# Patient Record
Sex: FEMALE | Race: BLACK OR AFRICAN AMERICAN | NOT HISPANIC OR LATINO | Employment: UNEMPLOYED | URBAN - METROPOLITAN AREA
[De-identification: names, ages, dates, MRNs, and addresses within clinical notes are randomized per-mention and may not be internally consistent; named-entity substitution may affect disease eponyms.]

---

## 2017-10-22 ENCOUNTER — HOSPITAL ENCOUNTER (EMERGENCY)
Facility: HOSPITAL | Age: 27
Discharge: HOME | End: 2017-10-22
Payer: MEDICAID

## 2017-10-22 VITALS
SYSTOLIC BLOOD PRESSURE: 114 MMHG | OXYGEN SATURATION: 96 % | TEMPERATURE: 98 F | HEART RATE: 101 BPM | HEIGHT: 64 IN | DIASTOLIC BLOOD PRESSURE: 65 MMHG | BODY MASS INDEX: 25.61 KG/M2 | RESPIRATION RATE: 20 BRPM | WEIGHT: 150 LBS

## 2017-10-22 DIAGNOSIS — O03.9 MISCARRIAGE: Primary | ICD-10-CM

## 2017-10-22 DIAGNOSIS — N39.0 URINARY TRACT INFECTION WITHOUT HEMATURIA, SITE UNSPECIFIED: ICD-10-CM

## 2017-10-22 DIAGNOSIS — R10.9 ABDOMINAL CRAMPING: ICD-10-CM

## 2017-10-22 LAB
B-HCG UR QL: NEGATIVE
BACTERIA #/AREA URNS HPF: ABNORMAL /HPF
BILIRUB UR QL STRIP: NEGATIVE
CLARITY UR: ABNORMAL
COLOR UR: YELLOW
GLUCOSE UR QL STRIP: NEGATIVE
HGB UR QL STRIP: ABNORMAL
KETONES UR QL STRIP: NEGATIVE
LEUKOCYTE ESTERASE UR QL STRIP: ABNORMAL
MICROSCOPIC COMMENT: ABNORMAL
NITRITE UR QL STRIP: POSITIVE
PH UR STRIP: 6 [PH] (ref 5–8)
PROT UR QL STRIP: NEGATIVE
RBC #/AREA URNS HPF: 2 /HPF (ref 0–4)
SP GR UR STRIP: 1.02 (ref 1–1.03)
SQUAMOUS #/AREA URNS HPF: 20 /HPF
URN SPEC COLLECT METH UR: ABNORMAL
UROBILINOGEN UR STRIP-ACNC: NEGATIVE EU/DL
WBC #/AREA URNS HPF: 15 /HPF (ref 0–5)
WBC CLUMPS URNS QL MICRO: ABNORMAL

## 2017-10-22 PROCEDURE — 81025 URINE PREGNANCY TEST: CPT

## 2017-10-22 PROCEDURE — 81000 URINALYSIS NONAUTO W/SCOPE: CPT

## 2017-10-22 PROCEDURE — 99283 EMERGENCY DEPT VISIT LOW MDM: CPT

## 2017-10-22 RX ORDER — NITROFURANTOIN (MACROCRYSTALS) 100 MG/1
100 CAPSULE ORAL 2 TIMES DAILY
Qty: 14 CAPSULE | Refills: 0 | Status: SHIPPED | OUTPATIENT
Start: 2017-10-22 | End: 2017-10-29

## 2017-10-22 RX ORDER — AMITRIPTYLINE HYDROCHLORIDE 50 MG/1
50 TABLET, FILM COATED ORAL NIGHTLY
COMMUNITY

## 2017-10-22 RX ORDER — OXYCODONE AND ACETAMINOPHEN 5; 325 MG/1; MG/1
1 TABLET ORAL EVERY 4 HOURS PRN
COMMUNITY
End: 2023-01-23

## 2017-10-22 RX ORDER — METHOCARBAMOL 500 MG/1
500 TABLET, FILM COATED ORAL 4 TIMES DAILY
Status: ON HOLD | COMMUNITY
End: 2023-01-25 | Stop reason: SDUPTHER

## 2017-10-22 RX ORDER — GABAPENTIN 600 MG/1
600 TABLET ORAL 3 TIMES DAILY
Status: ON HOLD | COMMUNITY
End: 2023-01-25 | Stop reason: SDUPTHER

## 2017-10-22 NOTE — ED PROVIDER NOTES
"SCRIBE #1 NOTE: I, Saw Cook, am scribing for, and in the presence of, Dinorah Glass PA-C. I have scribed the entire note.      History      Chief Complaint   Patient presents with    Vaginal Bleeding     Pt states, "I have been having cramping and bleeding for about a week and I don't know if I'm pregnant."       Review of patient's allergies indicates:   Allergen Reactions    Ceclor [cefaclor]     Hydrocodone Itching        HPI   HPI    10/22/2017, 4:14 PM   History obtained from the patient      History of Present Illness: Phillip Russell is a 27 y.o. female patient who presents to the Emergency Department for vaginal bleeding which onset gradually 6 days ago. Symptoms are intermittent and waxing and waning in severity. Pt states she last "spotted" on Friday. No mitigating or exacerbating factors reported. Associated sxs include pelvic pain described as "cramping." Patient denies any fever, chills, n/v/d, abd pain, hematochezia, constipation, dysuria, urinary frequency, vaginal discharge, vaginal pain, HA, light-headedness, and all other sxs at this time. No prior tx reported . Pt states that she tested positive for pregnancy at home 5 days ago but "felt something fall out of me, like a clot, on Monday and last night." Pt states she is unsure if she is still pregnant. Pt's LNMP was 8/20/17. Pt currently takes Percocet, Robaxin, Elavil, Naproxin, and Gabapentin daily. No further complaints or concerns at this time.       Arrival mode: Personal vehicle    PCP: YOJANA Hanson       Past Medical History:  Past Medical History:   Diagnosis Date    Bulging of cervical intervertebral disc     Herniated cervical disc     Miscarriage     Neck pain, chronic        Past Surgical History:  Past Surgical History:   Procedure Laterality Date    DILATION AND CURETTAGE OF UTERUS      HEMORRHOID SURGERY           Family History:  No family history on file.    Social History:  Social History "     Social History Main Topics    Smoking status: Never Smoker    Smokeless tobacco: Never Used    Alcohol use No    Drug use: No    Sexual activity: Not given       ROS   Review of Systems   Constitutional: Negative for chills and fever.   HENT: Negative for sore throat.    Respiratory: Negative for shortness of breath.    Cardiovascular: Negative for chest pain.   Gastrointestinal: Negative for abdominal pain, blood in stool, constipation, diarrhea, nausea and vomiting.   Genitourinary: Positive for pelvic pain and vaginal bleeding. Negative for dysuria, frequency, vaginal discharge and vaginal pain.   Musculoskeletal: Negative for back pain.   Skin: Negative for rash.   Neurological: Positive for dizziness. Negative for weakness, light-headedness and headaches.   Hematological: Does not bruise/bleed easily.   All other systems reviewed and are negative.      Physical Exam      Initial Vitals [10/22/17 1543]   BP Pulse Resp Temp SpO2   114/65 101 20 98.2 °F (36.8 °C) 96 %      MAP       81.33          Physical Exam  Nursing Notes and Vital Signs Reviewed.  Constitutional: Patient is in no acute distress. Well-developed and well-nourished.  Head: Atraumatic. Normocephalic.  Eyes: PERRL. EOM intact. Conjunctivae are not pale. No scleral icterus.  ENT: Mucous membranes are moist.     Neck: Supple. Full ROM. No lymphadenopathy.  Cardiovascular: Regular rate. Regular rhythm.  Pulmonary/Chest: No respiratory distress.   Abdominal: Soft and non-distended.  There is no tenderness.  No rebound, guarding, or rigidity.   Pelvic: A female chaperone was present for this examination. Nl external inspection. No lesions or abnormalities were visible on the labia majora or minora. Cervical os is closed. There is no CMT. There is no blood in the vaginal vault. No discharge. No adnexal tenderness. No adnexal masses.   Musculoskeletal: Moves all extremities. No obvious deformities. No edema.   Skin: Warm and dry.  Neurological:   "Alert, awake, and appropriate.  Normal speech.  No acute focal neurological deficits are appreciated.  Psychiatric: Normal affect. Good eye contact. Appropriate in content.    ED Course    Procedures  ED Vital Signs:  Vitals:    10/22/17 1543   BP: 114/65   Pulse: 101   Resp: 20   Temp: 98.2 °F (36.8 °C)   TempSrc: Oral   SpO2: 96%   Weight: 68 kg (150 lb)   Height: 5' 4" (1.626 m)       Abnormal Lab Results:  Labs Reviewed   URINALYSIS - Abnormal; Notable for the following:        Result Value    Appearance, UA Hazy (*)     Occult Blood UA Trace (*)     Nitrite, UA Positive (*)     Leukocytes, UA 1+ (*)     All other components within normal limits   URINALYSIS MICROSCOPIC - Abnormal; Notable for the following:     WBC, UA 15 (*)     Bacteria, UA Few (*)     All other components within normal limits   PREGNANCY TEST, URINE RAPID        All Lab Results:  Results for orders placed or performed during the hospital encounter of 10/22/17   Urinalysis Clean Catch   Result Value Ref Range    Specimen UA Urine, Clean Catch     Color, UA Yellow Yellow, Straw, Tiffany    Appearance, UA Hazy (A) Clear    pH, UA 6.0 5.0 - 8.0    Specific Gravity, UA 1.020 1.005 - 1.030    Protein, UA Negative Negative    Glucose, UA Negative Negative    Ketones, UA Negative Negative    Bilirubin (UA) Negative Negative    Occult Blood UA Trace (A) Negative    Nitrite, UA Positive (A) Negative    Urobilinogen, UA Negative <2.0 EU/dL    Leukocytes, UA 1+ (A) Negative   Pregnancy, urine rapid (UPT)   Result Value Ref Range    Preg Test, Ur Negative    Urinalysis Microscopic   Result Value Ref Range    RBC, UA 2 0 - 4 /hpf    WBC, UA 15 (H) 0 - 5 /hpf    WBC Clumps, UA Rare None-Rare    Bacteria, UA Few (A) None-Occ /hpf    Squam Epithel, UA 20 /hpf    Microscopic Comment SEE COMMENT             The Emergency Provider reviewed the vital signs and test results, which are outlined above.    ED Discussion     4:43 PM: Reassessed pt at this time.  Pt is " resting comfortably, in NAD, and VSS at this time. Discussed with pt all pertinent ED information and results. Discussed pt dx and plan of tx. Gave pt all f/u and return to the ED instructions. All questions and concerns were addressed at this time. Pt expresses understanding of information and instructions, and is comfortable with plan to discharge. Pt is stable for discharge.    I discussed with patient and/or family/caretaker that evaluation in the ED does not suggest any emergent or life threatening medical conditions requiring immediate intervention beyond what was provided in the ED, and I believe patient is safe for discharge.  Regardless, an unremarkable evaluation in the ED does not preclude the development or presence of a serious of life threatening condition. As such, patient was instructed to return immediately for any worsening or change in current symptoms.        New Prescriptions    NITROFURANTOIN (MACRODANTIN) 100 MG CAPSULE    Take 1 capsule (100 mg total) by mouth 2 (two) times daily.       Follow-up Information     YOJANA Hanson In 3 days.    Specialty:  Family Medicine  Contact information:  [address]  [facility] [address] [phone]             OB/GYN.    Contact information:  Schedule an appointment to follow-up in 2-3 days                   Medical Decision Making              Scribe Attestation:   Scribe #1: I performed the above scribed service and the documentation accurately describes the services I performed. I attest to the accuracy of the note.    Attending:   Physician Attestation Statement for Scribe #1: I, Dinorah Glass PA-C, personally performed the services described in this documentation, as scribed by Saw Cook, in my presence, and it is both accurate and complete.          Clinical Impression       ICD-10-CM ICD-9-CM   1. Miscarriage O03.9 634.90   2. Urinary tract infection without hematuria, site unspecified N39.0 599.0   3.  Abdominal cramping R10.9 789.00       Disposition:   Disposition: Discharged  Condition: Stable         Dinorah Glass PA-C  10/22/17 9230

## 2017-10-22 NOTE — ED TRIAGE NOTES
Pt states Monday she began cramping and bleeding, she felt something large fall out of her. Tuesday she took a home pregnancy test that was positive. Last night she had something large fall out of her again, so she wanted to get checked.

## 2021-08-13 ENCOUNTER — OFFICE VISIT (OUTPATIENT)
Dept: URGENT CARE | Facility: CLINIC | Age: 31
End: 2021-08-13
Payer: MEDICAID

## 2021-08-13 VITALS
OXYGEN SATURATION: 99 % | DIASTOLIC BLOOD PRESSURE: 61 MMHG | SYSTOLIC BLOOD PRESSURE: 117 MMHG | BODY MASS INDEX: 25.61 KG/M2 | TEMPERATURE: 97 F | HEART RATE: 85 BPM | HEIGHT: 64 IN | RESPIRATION RATE: 18 BRPM | WEIGHT: 150 LBS

## 2021-08-13 DIAGNOSIS — R50.9 FEVER, UNSPECIFIED FEVER CAUSE: Primary | ICD-10-CM

## 2021-08-13 LAB
CTP QC/QA: YES
SARS-COV-2 RDRP RESP QL NAA+PROBE: NEGATIVE

## 2021-08-13 PROCEDURE — 99202 PR OFFICE/OUTPT VISIT, NEW, LEVL II, 15-29 MIN: ICD-10-PCS | Mod: S$GLB,,, | Performed by: EMERGENCY MEDICINE

## 2021-08-13 PROCEDURE — 99202 OFFICE O/P NEW SF 15 MIN: CPT | Mod: S$GLB,,, | Performed by: EMERGENCY MEDICINE

## 2021-08-13 PROCEDURE — U0002: ICD-10-PCS | Mod: QW,S$GLB,, | Performed by: EMERGENCY MEDICINE

## 2021-08-13 PROCEDURE — U0002 COVID-19 LAB TEST NON-CDC: HCPCS | Mod: QW,S$GLB,, | Performed by: EMERGENCY MEDICINE

## 2021-08-13 RX ORDER — FLUTICASONE PROPIONATE 50 MCG
2 SPRAY, SUSPENSION (ML) NASAL DAILY
Qty: 16 G | Refills: 0 | Status: SHIPPED | OUTPATIENT
Start: 2021-08-13

## 2021-08-16 ENCOUNTER — OFFICE VISIT (OUTPATIENT)
Dept: URGENT CARE | Facility: CLINIC | Age: 31
End: 2021-08-16
Payer: MEDICAID

## 2021-08-16 VITALS
OXYGEN SATURATION: 96 % | HEIGHT: 64 IN | RESPIRATION RATE: 16 BRPM | TEMPERATURE: 101 F | WEIGHT: 190 LBS | SYSTOLIC BLOOD PRESSURE: 119 MMHG | HEART RATE: 108 BPM | BODY MASS INDEX: 32.44 KG/M2 | DIASTOLIC BLOOD PRESSURE: 74 MMHG

## 2021-08-16 DIAGNOSIS — R05.9 COUGH: Primary | ICD-10-CM

## 2021-08-16 DIAGNOSIS — U07.1 COVID-19: ICD-10-CM

## 2021-08-16 LAB
CTP QC/QA: YES
SARS-COV-2 RDRP RESP QL NAA+PROBE: POSITIVE

## 2021-08-16 PROCEDURE — U0002: ICD-10-PCS | Mod: QW,CR,S$GLB, | Performed by: PHYSICIAN ASSISTANT

## 2021-08-16 PROCEDURE — U0002 COVID-19 LAB TEST NON-CDC: HCPCS | Mod: QW,CR,S$GLB, | Performed by: PHYSICIAN ASSISTANT

## 2021-08-16 PROCEDURE — 99213 OFFICE O/P EST LOW 20 MIN: CPT | Mod: S$GLB,,, | Performed by: PHYSICIAN ASSISTANT

## 2021-08-16 PROCEDURE — 99213 PR OFFICE/OUTPT VISIT, EST, LEVL III, 20-29 MIN: ICD-10-PCS | Mod: S$GLB,,, | Performed by: PHYSICIAN ASSISTANT

## 2021-08-16 RX ORDER — PROMETHAZINE HYDROCHLORIDE AND DEXTROMETHORPHAN HYDROBROMIDE 6.25; 15 MG/5ML; MG/5ML
5 SYRUP ORAL EVERY 4 HOURS PRN
Qty: 118 ML | Refills: 0 | Status: SHIPPED | OUTPATIENT
Start: 2021-08-16 | End: 2021-08-26

## 2021-08-16 RX ORDER — IBUPROFEN 200 MG
800 TABLET ORAL
Status: COMPLETED | OUTPATIENT
Start: 2021-08-16 | End: 2021-08-16

## 2021-08-16 RX ORDER — BENZONATATE 100 MG/1
100 CAPSULE ORAL 3 TIMES DAILY PRN
Qty: 20 CAPSULE | Refills: 0 | Status: SHIPPED | OUTPATIENT
Start: 2021-08-16 | End: 2021-08-26

## 2021-08-16 RX ADMIN — Medication 800 MG: at 04:08

## 2021-08-22 ENCOUNTER — PATIENT MESSAGE (OUTPATIENT)
Dept: CARDIOLOGY | Facility: HOSPITAL | Age: 31
End: 2021-08-22

## 2021-09-17 ENCOUNTER — NURSE TRIAGE (OUTPATIENT)
Dept: ADMINISTRATIVE | Facility: CLINIC | Age: 31
End: 2021-09-17

## 2022-02-28 ENCOUNTER — PATIENT MESSAGE (OUTPATIENT)
Dept: RESEARCH | Facility: HOSPITAL | Age: 32
End: 2022-02-28
Payer: MEDICAID

## 2022-03-23 ENCOUNTER — OFFICE VISIT (OUTPATIENT)
Dept: URGENT CARE | Facility: CLINIC | Age: 32
End: 2022-03-23
Payer: MEDICAID

## 2022-03-23 VITALS
DIASTOLIC BLOOD PRESSURE: 67 MMHG | HEART RATE: 73 BPM | WEIGHT: 190 LBS | SYSTOLIC BLOOD PRESSURE: 110 MMHG | RESPIRATION RATE: 18 BRPM | HEIGHT: 64 IN | BODY MASS INDEX: 32.44 KG/M2 | TEMPERATURE: 99 F | OXYGEN SATURATION: 96 %

## 2022-03-23 DIAGNOSIS — R52 BODY ACHES: ICD-10-CM

## 2022-03-23 DIAGNOSIS — R42 LIGHT HEADEDNESS: ICD-10-CM

## 2022-03-23 DIAGNOSIS — R55 SYNCOPE, UNSPECIFIED SYNCOPE TYPE: Primary | ICD-10-CM

## 2022-03-23 LAB — GLUCOSE SERPL-MCNC: 92 MG/DL (ref 70–110)

## 2022-03-23 PROCEDURE — 1160F PR REVIEW ALL MEDS BY PRESCRIBER/CLIN PHARMACIST DOCUMENTED: ICD-10-PCS | Mod: CPTII,S$GLB,, | Performed by: PHYSICIAN ASSISTANT

## 2022-03-23 PROCEDURE — 3074F PR MOST RECENT SYSTOLIC BLOOD PRESSURE < 130 MM HG: ICD-10-PCS | Mod: CPTII,S$GLB,, | Performed by: PHYSICIAN ASSISTANT

## 2022-03-23 PROCEDURE — 3078F PR MOST RECENT DIASTOLIC BLOOD PRESSURE < 80 MM HG: ICD-10-PCS | Mod: CPTII,S$GLB,, | Performed by: PHYSICIAN ASSISTANT

## 2022-03-23 PROCEDURE — 3078F DIAST BP <80 MM HG: CPT | Mod: CPTII,S$GLB,, | Performed by: PHYSICIAN ASSISTANT

## 2022-03-23 PROCEDURE — 99214 OFFICE O/P EST MOD 30 MIN: CPT | Mod: S$GLB,,, | Performed by: PHYSICIAN ASSISTANT

## 2022-03-23 PROCEDURE — 93005 EKG 12-LEAD: ICD-10-PCS | Mod: S$GLB,,, | Performed by: PHYSICIAN ASSISTANT

## 2022-03-23 PROCEDURE — 93010 ELECTROCARDIOGRAM REPORT: CPT | Mod: S$GLB,,, | Performed by: INTERNAL MEDICINE

## 2022-03-23 PROCEDURE — 1160F RVW MEDS BY RX/DR IN RCRD: CPT | Mod: CPTII,S$GLB,, | Performed by: PHYSICIAN ASSISTANT

## 2022-03-23 PROCEDURE — 82962 GLUCOSE BLOOD TEST: CPT | Mod: S$GLB,,, | Performed by: PHYSICIAN ASSISTANT

## 2022-03-23 PROCEDURE — 93010 EKG 12-LEAD: ICD-10-PCS | Mod: S$GLB,,, | Performed by: INTERNAL MEDICINE

## 2022-03-23 PROCEDURE — 99214 PR OFFICE/OUTPT VISIT, EST, LEVL IV, 30-39 MIN: ICD-10-PCS | Mod: S$GLB,,, | Performed by: PHYSICIAN ASSISTANT

## 2022-03-23 PROCEDURE — 3008F PR BODY MASS INDEX (BMI) DOCUMENTED: ICD-10-PCS | Mod: CPTII,S$GLB,, | Performed by: PHYSICIAN ASSISTANT

## 2022-03-23 PROCEDURE — 1159F PR MEDICATION LIST DOCUMENTED IN MEDICAL RECORD: ICD-10-PCS | Mod: CPTII,S$GLB,, | Performed by: PHYSICIAN ASSISTANT

## 2022-03-23 PROCEDURE — 3008F BODY MASS INDEX DOCD: CPT | Mod: CPTII,S$GLB,, | Performed by: PHYSICIAN ASSISTANT

## 2022-03-23 PROCEDURE — 3074F SYST BP LT 130 MM HG: CPT | Mod: CPTII,S$GLB,, | Performed by: PHYSICIAN ASSISTANT

## 2022-03-23 PROCEDURE — 93005 ELECTROCARDIOGRAM TRACING: CPT | Mod: S$GLB,,, | Performed by: PHYSICIAN ASSISTANT

## 2022-03-23 PROCEDURE — 1159F MED LIST DOCD IN RCRD: CPT | Mod: CPTII,S$GLB,, | Performed by: PHYSICIAN ASSISTANT

## 2022-03-23 PROCEDURE — 82962 POCT GLUCOSE, HAND-HELD DEVICE: ICD-10-PCS | Mod: S$GLB,,, | Performed by: PHYSICIAN ASSISTANT

## 2022-03-23 RX ORDER — IBUPROFEN 600 MG/1
600 TABLET ORAL EVERY 6 HOURS PRN
Qty: 30 TABLET | Refills: 0 | Status: SHIPPED | OUTPATIENT
Start: 2022-03-23 | End: 2023-03-23

## 2022-03-23 NOTE — LETTER
March 23, 2022      [facility]  [address]  [place] [address]  Phone: [phone]       Patient: Phillip Russell   YOB: 1990  Date of Visit: 03/23/2022    To Whom It May Concern:    Mildred Russell  was at [facility] on 03/23/2022. The patient may return to work/school on 3/24/22 with no restrictions. If you have any questions or concerns, or if I can be of further assistance, please do not hesitate to contact me.    Sincerely,    Andria Moseley PA-C

## 2022-03-26 ENCOUNTER — TELEPHONE (OUTPATIENT)
Dept: URGENT CARE | Facility: CLINIC | Age: 32
End: 2022-03-26
Payer: MEDICAID

## 2022-10-05 ENCOUNTER — HOSPITAL ENCOUNTER (EMERGENCY)
Facility: HOSPITAL | Age: 32
Discharge: HOME | End: 2022-10-06
Attending: EMERGENCY MEDICINE
Payer: MEDICAID

## 2022-10-05 DIAGNOSIS — F16.10 PCP (PHENCYCLIDINE) ABUSE: ICD-10-CM

## 2022-10-05 DIAGNOSIS — R42 DIZZINESS: Primary | ICD-10-CM

## 2022-10-05 DIAGNOSIS — R00.0 TACHYCARDIA: ICD-10-CM

## 2022-10-05 LAB
ALBUMIN SERPL BCP-MCNC: 4.6 G/DL (ref 3.5–5.2)
ALP SERPL-CCNC: 80 U/L (ref 55–135)
ALT SERPL W/O P-5'-P-CCNC: 15 U/L (ref 10–44)
AMPHET+METHAMPHET UR QL: NEGATIVE
ANION GAP SERPL CALC-SCNC: 16 MMOL/L (ref 8–16)
AST SERPL-CCNC: 18 U/L (ref 10–40)
B-HCG UR QL: NEGATIVE
BACTERIA #/AREA URNS HPF: ABNORMAL /HPF
BARBITURATES UR QL SCN>200 NG/ML: NEGATIVE
BASOPHILS # BLD AUTO: 0.02 K/UL (ref 0–0.2)
BASOPHILS NFR BLD: 0.4 % (ref 0–1.9)
BENZODIAZ UR QL SCN>200 NG/ML: NEGATIVE
BILIRUB SERPL-MCNC: 1.3 MG/DL (ref 0.1–1)
BILIRUB UR QL STRIP: NEGATIVE
BUN SERPL-MCNC: 16 MG/DL (ref 6–20)
BZE UR QL SCN: NEGATIVE
CALCIUM SERPL-MCNC: 10.2 MG/DL (ref 8.7–10.5)
CANNABINOIDS UR QL SCN: NEGATIVE
CHLORIDE SERPL-SCNC: 99 MMOL/L (ref 95–110)
CLARITY UR: ABNORMAL
CO2 SERPL-SCNC: 21 MMOL/L (ref 23–29)
COLOR UR: YELLOW
CREAT SERPL-MCNC: 1.2 MG/DL (ref 0.5–1.4)
CREAT UR-MCNC: 141.9 MG/DL (ref 15–325)
DIFFERENTIAL METHOD: ABNORMAL
EOSINOPHIL # BLD AUTO: 0 K/UL (ref 0–0.5)
EOSINOPHIL NFR BLD: 0.8 % (ref 0–8)
ERYTHROCYTE [DISTWIDTH] IN BLOOD BY AUTOMATED COUNT: 12.2 % (ref 11.5–14.5)
EST. GFR  (NO RACE VARIABLE): >60 ML/MIN/1.73 M^2
GLUCOSE SERPL-MCNC: 75 MG/DL (ref 70–110)
GLUCOSE UR QL STRIP: NEGATIVE
HCT VFR BLD AUTO: 43.4 % (ref 37–48.5)
HGB BLD-MCNC: 15.1 G/DL (ref 12–16)
HGB UR QL STRIP: ABNORMAL
HYALINE CASTS #/AREA URNS LPF: 1 /LPF
IMM GRANULOCYTES # BLD AUTO: 0.02 K/UL (ref 0–0.04)
IMM GRANULOCYTES NFR BLD AUTO: 0.4 % (ref 0–0.5)
KETONES UR QL STRIP: ABNORMAL
LEUKOCYTE ESTERASE UR QL STRIP: NEGATIVE
LYMPHOCYTES # BLD AUTO: 2 K/UL (ref 1–4.8)
LYMPHOCYTES NFR BLD: 41.8 % (ref 18–48)
MCH RBC QN AUTO: 26.9 PG (ref 27–31)
MCHC RBC AUTO-ENTMCNC: 34.8 G/DL (ref 32–36)
MCV RBC AUTO: 77 FL (ref 82–98)
METHADONE UR QL SCN>300 NG/ML: NEGATIVE
MICROSCOPIC COMMENT: ABNORMAL
MONOCYTES # BLD AUTO: 0.5 K/UL (ref 0.3–1)
MONOCYTES NFR BLD: 10.3 % (ref 4–15)
NEUTROPHILS # BLD AUTO: 2.3 K/UL (ref 1.8–7.7)
NEUTROPHILS NFR BLD: 46.3 % (ref 38–73)
NITRITE UR QL STRIP: NEGATIVE
NRBC BLD-RTO: 0 /100 WBC
OPIATES UR QL SCN: NEGATIVE
PCP UR QL SCN>25 NG/ML: ABNORMAL
PH UR STRIP: 5 [PH] (ref 5–8)
PLATELET # BLD AUTO: 293 K/UL (ref 150–450)
PMV BLD AUTO: 9.2 FL (ref 9.2–12.9)
POTASSIUM SERPL-SCNC: 4.2 MMOL/L (ref 3.5–5.1)
PROT SERPL-MCNC: 8.5 G/DL (ref 6–8.4)
PROT UR QL STRIP: ABNORMAL
RBC # BLD AUTO: 5.61 M/UL (ref 4–5.4)
RBC #/AREA URNS HPF: 3 /HPF (ref 0–4)
SODIUM SERPL-SCNC: 136 MMOL/L (ref 136–145)
SP GR UR STRIP: 1.01 (ref 1–1.03)
SQUAMOUS #/AREA URNS HPF: 36 /HPF
T4 FREE SERPL-MCNC: 1.33 NG/DL (ref 0.71–1.51)
TOXICOLOGY INFORMATION: ABNORMAL
TSH SERPL DL<=0.005 MIU/L-ACNC: 1.52 UIU/ML (ref 0.4–4)
URN SPEC COLLECT METH UR: ABNORMAL
UROBILINOGEN UR STRIP-ACNC: NEGATIVE EU/DL
WBC # BLD AUTO: 4.86 K/UL (ref 3.9–12.7)
WBC #/AREA URNS HPF: 6 /HPF (ref 0–5)

## 2022-10-05 PROCEDURE — 85025 COMPLETE CBC W/AUTO DIFF WBC: CPT | Performed by: NURSE PRACTITIONER

## 2022-10-05 PROCEDURE — 84443 ASSAY THYROID STIM HORMONE: CPT | Performed by: NURSE PRACTITIONER

## 2022-10-05 PROCEDURE — 82550 ASSAY OF CK (CPK): CPT | Performed by: NURSE PRACTITIONER

## 2022-10-05 PROCEDURE — 96360 HYDRATION IV INFUSION INIT: CPT

## 2022-10-05 PROCEDURE — 80053 COMPREHEN METABOLIC PANEL: CPT | Performed by: NURSE PRACTITIONER

## 2022-10-05 PROCEDURE — 81025 URINE PREGNANCY TEST: CPT | Performed by: NURSE PRACTITIONER

## 2022-10-05 PROCEDURE — 96361 HYDRATE IV INFUSION ADD-ON: CPT

## 2022-10-05 PROCEDURE — 25000003 PHARM REV CODE 250: Performed by: EMERGENCY MEDICINE

## 2022-10-05 PROCEDURE — 84439 ASSAY OF FREE THYROXINE: CPT | Performed by: NURSE PRACTITIONER

## 2022-10-05 PROCEDURE — 81000 URINALYSIS NONAUTO W/SCOPE: CPT | Mod: 59 | Performed by: NURSE PRACTITIONER

## 2022-10-05 PROCEDURE — 99285 EMERGENCY DEPT VISIT HI MDM: CPT | Mod: 25

## 2022-10-05 PROCEDURE — 84480 ASSAY TRIIODOTHYRONINE (T3): CPT | Performed by: NURSE PRACTITIONER

## 2022-10-05 PROCEDURE — 85379 FIBRIN DEGRADATION QUANT: CPT | Mod: ER | Performed by: EMERGENCY MEDICINE

## 2022-10-05 PROCEDURE — 80307 DRUG TEST PRSMV CHEM ANLYZR: CPT | Performed by: EMERGENCY MEDICINE

## 2022-10-05 RX ADMIN — SODIUM CHLORIDE 1000 ML: 0.9 INJECTION, SOLUTION INTRAVENOUS at 10:10

## 2022-10-05 NOTE — Clinical Note
"Phillip Fordjaime Russell was seen and treated in our emergency department on 10/5/2022.  She may return to work on 10/07/2022.       If you have any questions or concerns, please don't hesitate to call.      CHINEDU MCPHERSON RN    "

## 2022-10-06 VITALS
HEART RATE: 78 BPM | WEIGHT: 139 LBS | OXYGEN SATURATION: 100 % | BODY MASS INDEX: 23.86 KG/M2 | RESPIRATION RATE: 20 BRPM | TEMPERATURE: 98 F | DIASTOLIC BLOOD PRESSURE: 57 MMHG | SYSTOLIC BLOOD PRESSURE: 112 MMHG

## 2022-10-06 LAB
CK SERPL-CCNC: 67 U/L (ref 20–180)
D DIMER PPP IA.FEU-MCNC: 0.53 MG/L FEU
T3 SERPL-MCNC: 80 NG/DL (ref 60–180)

## 2022-10-06 PROCEDURE — 25500020 PHARM REV CODE 255: Performed by: EMERGENCY MEDICINE

## 2022-10-06 RX ADMIN — IOHEXOL 100 ML: 350 INJECTION, SOLUTION INTRAVENOUS at 03:10

## 2022-10-06 NOTE — ED PROVIDER NOTES
"SCRIBE #1 NOTE: I, Garland Mckinley, am scribing for, and in the presence of, Maya Ding MD. I have scribed the entire note.       History     Chief Complaint   Patient presents with    Loss of Consciousness     Pt. Reports that she has been having episodes of "passing out" and falling. She says this has been going on for several months, she passed out tonight so her mother brought her in for evaluation.      Review of patient's allergies indicates:   Allergen Reactions    Ceclor [cefaclor]     Hydrocodone Itching         History of Present Illness     HPI    10/5/2022, 9:46 PM  History obtained from the patient      History of Present Illness: Phillip Russell is a 31 y.o. female patient who presents to the Emergency Department for evaluation of LOC which onset gradually x 4 days. Pt says she has been having episodes of passing out frequently since last Saturday. Pt reports having similar episodes in the past but never this often. Symptoms are constant and moderate in severity. No mitigating or exacerbating factors reported. Associated sxs include dizziness, tremors, nausea, CP,and palpitations. Patient denies any f/c, HA, rhinorrhea, congestion, diarrhea, and all other sxs at this time. No prior Tx reported. No further complaints or concerns at this time.       Arrival mode: Personal vehicle     PCP: YOJANA Hanson        Past Medical History:  Past Medical History:   Diagnosis Date    Bulging of cervical intervertebral disc     Herniated cervical disc     Miscarriage     Neck pain, chronic        Past Surgical History:  Past Surgical History:   Procedure Laterality Date    DILATION AND CURETTAGE OF UTERUS      HEMORRHOID SURGERY           Family History:  No family history on file.    Social History:  Social History     Tobacco Use    Smoking status: Never    Smokeless tobacco: Never   Substance and Sexual Activity    Alcohol use: No    Drug use: No    Sexual activity: Not on file        Review of " Systems     Review of Systems   Constitutional:  Negative for chills and fever.   HENT:  Negative for congestion, rhinorrhea and sore throat.    Respiratory:  Negative for shortness of breath.    Cardiovascular:  Positive for chest pain and palpitations.   Gastrointestinal:  Positive for nausea. Negative for diarrhea.   Genitourinary:  Negative for dysuria.   Musculoskeletal:  Negative for back pain.   Skin:  Negative for rash.   Neurological:  Positive for dizziness, tremors and light-headedness. Negative for weakness and headaches.   Hematological:  Does not bruise/bleed easily.   All other systems reviewed and are negative.     Physical Exam     Initial Vitals [10/05/22 2053]   BP Pulse Resp Temp SpO2   (!) 131/94 (!) 130 18 98.1 °F (36.7 °C) 97 %      MAP       --          Physical Exam  Nursing Notes and Vital Signs Reviewed.  Constitutional: Patient is in no acute distress. Well-developed and well-nourished.  Head: Atraumatic. Normocephalic.  Eyes: PERRL. EOM intact. Conjunctivae are not pale. No scleral icterus.  ENT: Mucous membranes are dry. Oropharynx is clear and symmetric.    Neck: Supple. Full ROM. No lymphadenopathy.  Cardiovascular: Regular rate. Regular rhythm. No murmurs, rubs, or gallops. Distal pulses are 2+ and symmetric.  Pulmonary/Chest: No respiratory distress. Clear to auscultation bilaterally. No wheezing or rales.  Abdominal: Soft and non-distended.  There is no tenderness.  No rebound, guarding, or rigidity. Good bowel sounds.  Genitourinary: No CVA tenderness  Musculoskeletal: Moves all extremities. No obvious deformities. No edema. No calf tenderness.  Skin: Warm and dry.  Neurological:  Alert, awake, and appropriate.  Normal speech.  No acute focal neurological deficits are appreciated.  Psychiatric: Normal affect. Good eye contact. Appropriate in content.     ED Course   Procedures  ED Vital Signs:  Vitals:    10/05/22 2053 10/05/22 2057 10/05/22 2211 10/05/22 2233   BP: (!) 131/94  114/81 119/83 123/86   Pulse: (!) 130 (!) 148 100 110   Resp: 18  18 (!) 25   Temp: 98.1 °F (36.7 °C)      TempSrc: Oral      SpO2: 97%  100% 100%   Weight: 63 kg (139 lb)       10/05/22 2316 10/06/22 0003 10/06/22 0033 10/06/22 0138   BP: 121/76 135/73 135/72 134/74   Pulse: 96 95 91 93   Resp: (!) 22 (!) 23 (!) 24 (!) 27   Temp:       TempSrc:       SpO2: 98% 98% 98% 98%   Weight:        10/06/22 0203 10/06/22 0437   BP: 126/72 (!) 112/57   Pulse: 91 78   Resp: 20 20   Temp:     TempSrc:     SpO2: 100% 100%   Weight:         Abnormal Lab Results:  Labs Reviewed   CBC W/ AUTO DIFFERENTIAL - Abnormal; Notable for the following components:       Result Value    RBC 5.61 (*)     MCV 77 (*)     MCH 26.9 (*)     All other components within normal limits   COMPREHENSIVE METABOLIC PANEL - Abnormal; Notable for the following components:    CO2 21 (*)     Total Protein 8.5 (*)     Total Bilirubin 1.3 (*)     All other components within normal limits   URINALYSIS, REFLEX TO URINE CULTURE - Abnormal; Notable for the following components:    Appearance, UA Hazy (*)     Protein, UA Trace (*)     Ketones, UA 2+ (*)     Occult Blood UA 2+ (*)     All other components within normal limits    Narrative:     Specimen Source->Urine   DRUG SCREEN PANEL, URINE EMERGENCY - Abnormal; Notable for the following components:    Phencyclidine Presumptive Positive (*)     All other components within normal limits    Narrative:     Specimen Source->Urine   URINALYSIS MICROSCOPIC - Abnormal; Notable for the following components:    WBC, UA 6 (*)     All other components within normal limits    Narrative:     Specimen Source->Urine   D DIMER, QUANTITATIVE - Abnormal; Notable for the following components:    D-Dimer 0.53 (*)     All other components within normal limits   TSH   PREGNANCY TEST, URINE RAPID    Narrative:     Specimen Source->Urine   T3   T4, FREE   CK   CK        All Lab Results:  Results for orders placed or performed during the  hospital encounter of 10/05/22   CBC auto differential   Result Value Ref Range    WBC 4.86 3.90 - 12.70 K/uL    RBC 5.61 (H) 4.00 - 5.40 M/uL    Hemoglobin 15.1 12.0 - 16.0 g/dL    Hematocrit 43.4 37.0 - 48.5 %    MCV 77 (L) 82 - 98 fL    MCH 26.9 (L) 27.0 - 31.0 pg    MCHC 34.8 32.0 - 36.0 g/dL    RDW 12.2 11.5 - 14.5 %    Platelets 293 150 - 450 K/uL    MPV 9.2 9.2 - 12.9 fL    Immature Granulocytes 0.4 0.0 - 0.5 %    Gran # (ANC) 2.3 1.8 - 7.7 K/uL    Immature Grans (Abs) 0.02 0.00 - 0.04 K/uL    Lymph # 2.0 1.0 - 4.8 K/uL    Mono # 0.5 0.3 - 1.0 K/uL    Eos # 0.0 0.0 - 0.5 K/uL    Baso # 0.02 0.00 - 0.20 K/uL    nRBC 0 0 /100 WBC    Gran % 46.3 38.0 - 73.0 %    Lymph % 41.8 18.0 - 48.0 %    Mono % 10.3 4.0 - 15.0 %    Eosinophil % 0.8 0.0 - 8.0 %    Basophil % 0.4 0.0 - 1.9 %    Differential Method Automated    Comprehensive metabolic panel   Result Value Ref Range    Sodium 136 136 - 145 mmol/L    Potassium 4.2 3.5 - 5.1 mmol/L    Chloride 99 95 - 110 mmol/L    CO2 21 (L) 23 - 29 mmol/L    Glucose 75 70 - 110 mg/dL    BUN 16 6 - 20 mg/dL    Creatinine 1.2 0.5 - 1.4 mg/dL    Calcium 10.2 8.7 - 10.5 mg/dL    Total Protein 8.5 (H) 6.0 - 8.4 g/dL    Albumin 4.6 3.5 - 5.2 g/dL    Total Bilirubin 1.3 (H) 0.1 - 1.0 mg/dL    Alkaline Phosphatase 80 55 - 135 U/L    AST 18 10 - 40 U/L    ALT 15 10 - 44 U/L    Anion Gap 16 8 - 16 mmol/L    eGFR >60 >60 mL/min/1.73 m^2   TSH   Result Value Ref Range    TSH 1.521 0.400 - 4.000 uIU/mL   Urinalysis, Reflex to Urine Culture Urine, Clean Catch    Specimen: Urine   Result Value Ref Range    Specimen UA Urine, Clean Catch     Color, UA Yellow Yellow, Straw, Tiffany    Appearance, UA Hazy (A) Clear    pH, UA 5.0 5.0 - 8.0    Specific Gravity, UA 1.010 1.005 - 1.030    Protein, UA Trace (A) Negative    Glucose, UA Negative Negative    Ketones, UA 2+ (A) Negative    Bilirubin (UA) Negative Negative    Occult Blood UA 2+ (A) Negative    Nitrite, UA Negative Negative    Urobilinogen,  UA Negative <2.0 EU/dL    Leukocytes, UA Negative Negative   Pregnancy, urine rapid   Result Value Ref Range    Preg Test, Ur Negative    T3   Result Value Ref Range    T3, Total 80 60 - 180 ng/dL   T4, Free   Result Value Ref Range    Free T4 1.33 0.71 - 1.51 ng/dL   Drug screen panel, emergency   Result Value Ref Range    Benzodiazepines Negative Negative    Methadone metabolites Negative Negative    Cocaine (Metab.) Negative Negative    Opiate Scrn, Ur Negative Negative    Barbiturate Screen, Ur Negative Negative    Amphetamine Screen, Ur Negative Negative    THC Negative Negative    Phencyclidine Presumptive Positive (A) Negative    Creatinine, Urine 141.9 15.0 - 325.0 mg/dL    Toxicology Information SEE COMMENT    Urinalysis Microscopic   Result Value Ref Range    RBC, UA 3 0 - 4 /hpf    WBC, UA 6 (H) 0 - 5 /hpf    Bacteria Occasional None-Occ /hpf    Squam Epithel, UA 36 /hpf    Hyaline Casts, UA 1 0-1/lpf /lpf    Microscopic Comment SEE COMMENT    D dimer, quantitative   Result Value Ref Range    D-Dimer 0.53 (H) <0.50 mg/L FEU   CK   Result Value Ref Range    CPK 67 20 - 180 U/L         Imaging Results:  Imaging Results              CTA Chest Non-Coronary (PE Studies) (In process)                      X-Ray Chest AP Portable (Final result)  Result time 10/05/22 21:29:50      Final result by Yarelis Parker MD (10/05/22 21:29:50)                   Impression:      No active finding      Electronically signed by: Yarelis Parker  Date:    10/05/2022  Time:    21:29               Narrative:    EXAMINATION:  XR CHEST AP PORTABLE    CLINICAL HISTORY:  syncope;    COMPARISON:  None none available    FINDINGS:  Lungs are well aerated.  Mediastinal contour normal midline                                   Type of Interpretation: Outside Written Report.  Radiology Procedure Done: Chest CT with Contrast.  Interpretation: Normal chest CTA. No pulmonary embolism.    Radiologist: Getachew Cervantes MD          The EKG was  ordered, reviewed, and independently interpreted by the ED provider.  Interpretation time: 15:00  Rate: 59 BPM  Rhythm: sinus bradycardia  Interpretation: No acute ST changes. No STEMI.             The Emergency Provider reviewed the vital signs and test results, which are outlined above.     ED Discussion       4:28 AM: Reassessed pt at this time.Discussed with pt all pertinent ED information and results. Discussed pt dx and plan of tx. Gave pt all f/u and return to the ED instructions. All questions and concerns were addressed at this time. Pt expresses understanding of information and instructions, and is comfortable with plan to discharge. Pt is stable for discharge.    I discussed with patient and/or family/caretaker that evaluation in the ED does not suggest any emergent or life threatening medical conditions requiring immediate intervention beyond what was provided in the ED, and I believe patient is safe for discharge.  Regardless, an unremarkable evaluation in the ED does not preclude the development or presence of a serious of life threatening condition. As such, patient was instructed to return immediately for any worsening or change in current symptoms.         Medical Decision Making:   Clinical Tests:   Lab Tests: Ordered and Reviewed  Radiological Study: Ordered and Reviewed  Medical Tests: Ordered and Reviewed         ED Medication(s):  Medications   sodium chloride 0.9% bolus 1,000 mL (0 mLs Intravenous Stopped 10/6/22 0016)   iohexoL (OMNIPAQUE 350) injection 100 mL (100 mLs Intravenous Given 10/6/22 0301)       Discharge Medication List as of 10/6/2022  4:25 AM           Follow-up Information       YOJANA Hanson In 1 day.    Specialty: Family Medicine  Contact information:  [address]  [facility] [address]  [phone]               [facility]    Specialty: Emergency Medicine  Why: As needed, If symptoms worsen  Contact information:  [address]  [facility] [address] [phone]                               Scribe Attestation:   Scribe #1: I performed the above scribed service and the documentation accurately describes the services I performed. I attest to the accuracy of the note.     Attending:   Physician Attestation Statement for Scribe #1: I, Maya Ding MD, personally performed the services described in this documentation, as scribed by Garland Mckinley, in my presence, and it is both accurate and complete.           Clinical Impression       ICD-10-CM ICD-9-CM   1. Dizziness  R42 780.4   2. 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

## 2022-10-06 NOTE — FIRST PROVIDER EVALUATION
Medical screening examination initiated.  I have conducted a focused provider triage encounter, findings are as follows:    Brief history of present illness:  Patient complains of sickle episodes drastic weight loss    Vitals:    10/05/22 2053 10/05/22 2057   BP: (!) 131/94 114/81   BP Location: Right arm Right arm   Patient Position: Sitting Standing   Pulse: (!) 130 (!) 148   Resp: 18    Temp: 98.1 °F (36.7 °C)    TempSrc: Oral    SpO2: 97%    Weight: 63 kg (139 lb)        Pertinent physical exam:  Patient appears weak    Brief workup plan:  Lab work    Preliminary workup initiated; this workup will be continued and followed by the physician or advanced practice provider that is assigned to the patient when roomed.

## 2023-01-23 ENCOUNTER — HOSPITAL ENCOUNTER (OUTPATIENT)
Facility: HOSPITAL | Age: 33
Discharge: HOME | End: 2023-01-25
Attending: EMERGENCY MEDICINE | Admitting: INTERNAL MEDICINE
Payer: MEDICAID

## 2023-01-23 DIAGNOSIS — R55 SYNCOPE AND COLLAPSE: Primary | ICD-10-CM

## 2023-01-23 DIAGNOSIS — R55 RECURRENT SYNCOPE: ICD-10-CM

## 2023-01-23 DIAGNOSIS — R55 SYNCOPE: ICD-10-CM

## 2023-01-23 LAB
ALBUMIN SERPL BCP-MCNC: 3.8 G/DL (ref 3.5–5.2)
ALP SERPL-CCNC: 67 U/L (ref 55–135)
ALT SERPL W/O P-5'-P-CCNC: 17 U/L (ref 10–44)
AMPHET+METHAMPHET UR QL: NEGATIVE
ANION GAP SERPL CALC-SCNC: 11 MMOL/L (ref 8–16)
AST SERPL-CCNC: 18 U/L (ref 10–40)
B-HCG UR QL: NEGATIVE
BARBITURATES UR QL SCN>200 NG/ML: NEGATIVE
BASOPHILS # BLD AUTO: 0.02 K/UL (ref 0–0.2)
BASOPHILS NFR BLD: 0.3 % (ref 0–1.9)
BENZODIAZ UR QL SCN>200 NG/ML: NEGATIVE
BILIRUB SERPL-MCNC: 0.5 MG/DL (ref 0.1–1)
BUN SERPL-MCNC: 9 MG/DL (ref 6–20)
BZE UR QL SCN: NEGATIVE
CALCIUM SERPL-MCNC: 9.6 MG/DL (ref 8.7–10.5)
CANNABINOIDS UR QL SCN: NEGATIVE
CHLORIDE SERPL-SCNC: 104 MMOL/L (ref 95–110)
CK SERPL-CCNC: 66 U/L (ref 20–180)
CO2 SERPL-SCNC: 23 MMOL/L (ref 23–29)
CREAT SERPL-MCNC: 1.2 MG/DL (ref 0.5–1.4)
CREAT UR-MCNC: 150.7 MG/DL (ref 15–325)
DIFFERENTIAL METHOD: ABNORMAL
EOSINOPHIL # BLD AUTO: 0 K/UL (ref 0–0.5)
EOSINOPHIL NFR BLD: 0.6 % (ref 0–8)
ERYTHROCYTE [DISTWIDTH] IN BLOOD BY AUTOMATED COUNT: 13 % (ref 11.5–14.5)
EST. GFR  (NO RACE VARIABLE): >60 ML/MIN/1.73 M^2
GLUCOSE SERPL-MCNC: 95 MG/DL (ref 70–110)
HCT VFR BLD AUTO: 39.4 % (ref 37–48.5)
HGB BLD-MCNC: 13.3 G/DL (ref 12–16)
IMM GRANULOCYTES # BLD AUTO: 0.03 K/UL (ref 0–0.04)
IMM GRANULOCYTES NFR BLD AUTO: 0.5 % (ref 0–0.5)
LYMPHOCYTES # BLD AUTO: 1.9 K/UL (ref 1–4.8)
LYMPHOCYTES NFR BLD: 29.4 % (ref 18–48)
MAGNESIUM SERPL-MCNC: 1.7 MG/DL (ref 1.6–2.6)
MCH RBC QN AUTO: 26.3 PG (ref 27–31)
MCHC RBC AUTO-ENTMCNC: 33.8 G/DL (ref 32–36)
MCV RBC AUTO: 78 FL (ref 82–98)
METHADONE UR QL SCN>300 NG/ML: NEGATIVE
MONOCYTES # BLD AUTO: 0.5 K/UL (ref 0.3–1)
MONOCYTES NFR BLD: 7.4 % (ref 4–15)
NEUTROPHILS # BLD AUTO: 4 K/UL (ref 1.8–7.7)
NEUTROPHILS NFR BLD: 61.8 % (ref 38–73)
NRBC BLD-RTO: 0 /100 WBC
OPIATES UR QL SCN: NEGATIVE
PCP UR QL SCN>25 NG/ML: ABNORMAL
PLATELET # BLD AUTO: 283 K/UL (ref 150–450)
PMV BLD AUTO: 8.9 FL (ref 9.2–12.9)
POTASSIUM SERPL-SCNC: 3.9 MMOL/L (ref 3.5–5.1)
PROT SERPL-MCNC: 7.6 G/DL (ref 6–8.4)
RBC # BLD AUTO: 5.05 M/UL (ref 4–5.4)
SODIUM SERPL-SCNC: 138 MMOL/L (ref 136–145)
TOXICOLOGY INFORMATION: ABNORMAL
TROPONIN I SERPL DL<=0.01 NG/ML-MCNC: <0.006 NG/ML (ref 0–0.03)
TSH SERPL DL<=0.005 MIU/L-ACNC: 1.35 UIU/ML (ref 0.4–4)
WBC # BLD AUTO: 6.5 K/UL (ref 3.9–12.7)

## 2023-01-23 PROCEDURE — 63600175 PHARM REV CODE 636 W HCPCS: Performed by: INTERNAL MEDICINE

## 2023-01-23 PROCEDURE — 25000003 PHARM REV CODE 250: Performed by: NURSE PRACTITIONER

## 2023-01-23 PROCEDURE — G0378 HOSPITAL OBSERVATION PER HR: HCPCS

## 2023-01-23 PROCEDURE — 84443 ASSAY THYROID STIM HORMONE: CPT | Performed by: NURSE PRACTITIONER

## 2023-01-23 PROCEDURE — 96360 HYDRATION IV INFUSION INIT: CPT

## 2023-01-23 PROCEDURE — 80053 COMPREHEN METABOLIC PANEL: CPT | Performed by: NURSE PRACTITIONER

## 2023-01-23 PROCEDURE — 84484 ASSAY OF TROPONIN QUANT: CPT | Performed by: NURSE PRACTITIONER

## 2023-01-23 PROCEDURE — 81025 URINE PREGNANCY TEST: CPT | Performed by: NURSE PRACTITIONER

## 2023-01-23 PROCEDURE — 93010 EKG 12-LEAD: ICD-10-PCS | Mod: ,,, | Performed by: INTERNAL MEDICINE

## 2023-01-23 PROCEDURE — 82550 ASSAY OF CK (CPK): CPT | Performed by: NURSE PRACTITIONER

## 2023-01-23 PROCEDURE — 93010 ELECTROCARDIOGRAM REPORT: CPT | Mod: ,,, | Performed by: INTERNAL MEDICINE

## 2023-01-23 PROCEDURE — 83735 ASSAY OF MAGNESIUM: CPT | Performed by: EMERGENCY MEDICINE

## 2023-01-23 PROCEDURE — 85025 COMPLETE CBC W/AUTO DIFF WBC: CPT | Performed by: NURSE PRACTITIONER

## 2023-01-23 PROCEDURE — 80307 DRUG TEST PRSMV CHEM ANLYZR: CPT | Performed by: EMERGENCY MEDICINE

## 2023-01-23 PROCEDURE — 99285 EMERGENCY DEPT VISIT HI MDM: CPT | Mod: 25

## 2023-01-23 PROCEDURE — 93005 ELECTROCARDIOGRAM TRACING: CPT

## 2023-01-23 PROCEDURE — 25000003 PHARM REV CODE 250: Performed by: INTERNAL MEDICINE

## 2023-01-23 PROCEDURE — 36415 COLL VENOUS BLD VENIPUNCTURE: CPT | Performed by: NURSE PRACTITIONER

## 2023-01-23 PROCEDURE — 96372 THER/PROPH/DIAG INJ SC/IM: CPT | Performed by: INTERNAL MEDICINE

## 2023-01-23 PROCEDURE — 96361 HYDRATE IV INFUSION ADD-ON: CPT

## 2023-01-23 RX ORDER — ONDANSETRON 2 MG/ML
4 INJECTION INTRAMUSCULAR; INTRAVENOUS EVERY 8 HOURS PRN
Status: DISCONTINUED | OUTPATIENT
Start: 2023-01-23 | End: 2023-01-25 | Stop reason: HOSPADM

## 2023-01-23 RX ORDER — SODIUM CHLORIDE 9 MG/ML
INJECTION, SOLUTION INTRAVENOUS CONTINUOUS
Status: ACTIVE | OUTPATIENT
Start: 2023-01-23 | End: 2023-01-24

## 2023-01-23 RX ORDER — ENOXAPARIN SODIUM 100 MG/ML
40 INJECTION SUBCUTANEOUS EVERY 24 HOURS
Status: DISCONTINUED | OUTPATIENT
Start: 2023-01-23 | End: 2023-01-25 | Stop reason: HOSPADM

## 2023-01-23 RX ORDER — ACETAMINOPHEN 325 MG/1
650 TABLET ORAL EVERY 6 HOURS PRN
Status: DISCONTINUED | OUTPATIENT
Start: 2023-01-23 | End: 2023-01-25 | Stop reason: HOSPADM

## 2023-01-23 RX ORDER — SODIUM CHLORIDE 9 MG/ML
1000 INJECTION, SOLUTION INTRAVENOUS
Status: COMPLETED | OUTPATIENT
Start: 2023-01-23 | End: 2023-01-23

## 2023-01-23 RX ORDER — GUAIFENESIN 100 MG/5ML
200 SOLUTION ORAL EVERY 4 HOURS PRN
Status: DISCONTINUED | OUTPATIENT
Start: 2023-01-23 | End: 2023-01-25 | Stop reason: HOSPADM

## 2023-01-23 RX ORDER — IPRATROPIUM BROMIDE AND ALBUTEROL SULFATE 2.5; .5 MG/3ML; MG/3ML
3 SOLUTION RESPIRATORY (INHALATION) EVERY 4 HOURS PRN
Status: DISCONTINUED | OUTPATIENT
Start: 2023-01-23 | End: 2023-01-25 | Stop reason: HOSPADM

## 2023-01-23 RX ORDER — MAG HYDROX/ALUMINUM HYD/SIMETH 200-200-20
30 SUSPENSION, ORAL (FINAL DOSE FORM) ORAL EVERY 6 HOURS PRN
Status: DISCONTINUED | OUTPATIENT
Start: 2023-01-23 | End: 2023-01-25 | Stop reason: HOSPADM

## 2023-01-23 RX ADMIN — SODIUM CHLORIDE: 9 INJECTION, SOLUTION INTRAVENOUS at 09:01

## 2023-01-23 RX ADMIN — SODIUM CHLORIDE 1000 ML: 9 INJECTION, SOLUTION INTRAVENOUS at 05:01

## 2023-01-23 RX ADMIN — ALUMINUM HYDROXIDE, MAGNESIUM HYDROXIDE, AND DIMETHICONE 30 ML: 200; 20; 200 SUSPENSION ORAL at 09:01

## 2023-01-23 RX ADMIN — ENOXAPARIN SODIUM 40 MG: 40 INJECTION SUBCUTANEOUS at 09:01

## 2023-01-23 NOTE — FIRST PROVIDER EVALUATION
"Medical screening examination initiated.  I have conducted a focused provider triage encounter, findings are as follows:    Brief history of present illness:  32-year-old female with complaint of multiple syncopal episodes over the past several weeks.  Patient reports she gets syncopal every time she stands up.  Denies chest pain.  Denies shortness of breath.    Vitals:    01/23/23 1435   BP: 116/68   BP Location: Right arm   Patient Position: Sitting   Pulse: 95   Resp: 16   Temp: 98 °F (36.7 °C)   TempSrc: Oral   SpO2: 99%   Height: 5' 4" (1.626 m)       Pertinent physical exam:  AAOX3  "

## 2023-01-24 LAB
ALBUMIN SERPL BCP-MCNC: 3.4 G/DL (ref 3.5–5.2)
ALP SERPL-CCNC: 58 U/L (ref 55–135)
ALT SERPL W/O P-5'-P-CCNC: 14 U/L (ref 10–44)
ANION GAP SERPL CALC-SCNC: 11 MMOL/L (ref 8–16)
AORTIC ROOT ANNULUS: 2.64 CM
ASCENDING AORTA: 2.79 CM
AST SERPL-CCNC: 18 U/L (ref 10–40)
AV INDEX (PROSTH): 0.79
AV MEAN GRADIENT: 3 MMHG
AV PEAK GRADIENT: 6 MMHG
AV VALVE AREA: 1.79 CM2
AV VELOCITY RATIO: 0.79
BASOPHILS # BLD AUTO: 0.03 K/UL (ref 0–0.2)
BASOPHILS NFR BLD: 0.5 % (ref 0–1.9)
BILIRUB SERPL-MCNC: 0.4 MG/DL (ref 0.1–1)
BSA FOR ECHO PROCEDURE: 1.71 M2
BUN SERPL-MCNC: 8 MG/DL (ref 6–20)
CALCIUM SERPL-MCNC: 8.5 MG/DL (ref 8.7–10.5)
CHLORIDE SERPL-SCNC: 108 MMOL/L (ref 95–110)
CO2 SERPL-SCNC: 24 MMOL/L (ref 23–29)
CREAT SERPL-MCNC: 1 MG/DL (ref 0.5–1.4)
CV ECHO LV RWT: 0.44 CM
DIFFERENTIAL METHOD: ABNORMAL
DOP CALC AO PEAK VEL: 1.21 M/S
DOP CALC AO VTI: 24.5 CM
DOP CALC LVOT AREA: 2.3 CM2
DOP CALC LVOT DIAMETER: 1.7 CM
DOP CALC LVOT PEAK VEL: 0.96 M/S
DOP CALC LVOT STROKE VOLUME: 43.78 CM3
DOP CALC RVOT PEAK VEL: 0.57 M/S
DOP CALC RVOT VTI: 11.7 CM
DOP CALCLVOT PEAK VEL VTI: 19.3 CM
E WAVE DECELERATION TIME: 141.29 MSEC
E/A RATIO: 1.36
E/E' RATIO: 6.21 M/S
ECHO LV POSTERIOR WALL: 0.8 CM (ref 0.6–1.1)
EJECTION FRACTION: 60 %
EOSINOPHIL # BLD AUTO: 0.1 K/UL (ref 0–0.5)
EOSINOPHIL NFR BLD: 1.5 % (ref 0–8)
ERYTHROCYTE [DISTWIDTH] IN BLOOD BY AUTOMATED COUNT: 12.9 % (ref 11.5–14.5)
EST. GFR  (NO RACE VARIABLE): >60 ML/MIN/1.73 M^2
FRACTIONAL SHORTENING: 30 % (ref 28–44)
GLUCOSE SERPL-MCNC: 89 MG/DL (ref 70–110)
HCT VFR BLD AUTO: 36.2 % (ref 37–48.5)
HGB BLD-MCNC: 12.1 G/DL (ref 12–16)
IMM GRANULOCYTES # BLD AUTO: 0.03 K/UL (ref 0–0.04)
IMM GRANULOCYTES NFR BLD AUTO: 0.5 % (ref 0–0.5)
INTERVENTRICULAR SEPTUM: 0.82 CM (ref 0.6–1.1)
IVC DIAMETER: 1.5 CM
IVRT: 76.12 MSEC
LA MAJOR: 3.36 CM
LA MINOR: 3.47 CM
LA WIDTH: 2.9 CM
LEFT ATRIUM SIZE: 2.13 CM
LEFT ATRIUM VOLUME INDEX: 10.5 ML/M2
LEFT ATRIUM VOLUME: 17.93 CM3
LEFT INTERNAL DIMENSION IN SYSTOLE: 2.58 CM (ref 2.1–4)
LEFT VENTRICLE DIASTOLIC VOLUME INDEX: 33.32 ML/M2
LEFT VENTRICLE DIASTOLIC VOLUME: 56.65 ML
LEFT VENTRICLE MASS INDEX: 48 G/M2
LEFT VENTRICLE SYSTOLIC VOLUME INDEX: 14.2 ML/M2
LEFT VENTRICLE SYSTOLIC VOLUME: 24.13 ML
LEFT VENTRICULAR INTERNAL DIMENSION IN DIASTOLE: 3.66 CM (ref 3.5–6)
LEFT VENTRICULAR MASS: 82.28 G
LV LATERAL E/E' RATIO: 5.44 M/S
LV SEPTAL E/E' RATIO: 7.25 M/S
LVOT MG: 2.11 MMHG
LVOT MV: 0.69 CM/S
LYMPHOCYTES # BLD AUTO: 2.3 K/UL (ref 1–4.8)
LYMPHOCYTES NFR BLD: 37.1 % (ref 18–48)
MAGNESIUM SERPL-MCNC: 1.8 MG/DL (ref 1.6–2.6)
MCH RBC QN AUTO: 26.3 PG (ref 27–31)
MCHC RBC AUTO-ENTMCNC: 33.4 G/DL (ref 32–36)
MCV RBC AUTO: 79 FL (ref 82–98)
MONOCYTES # BLD AUTO: 0.5 K/UL (ref 0.3–1)
MONOCYTES NFR BLD: 8.2 % (ref 4–15)
MV PEAK A VEL: 0.64 M/S
MV PEAK E VEL: 0.87 M/S
NEUTROPHILS # BLD AUTO: 3.2 K/UL (ref 1.8–7.7)
NEUTROPHILS NFR BLD: 52.2 % (ref 38–73)
NRBC BLD-RTO: 0 /100 WBC
PISA TR MAX VEL: 2.61 M/S
PLATELET # BLD AUTO: 256 K/UL (ref 150–450)
PMV BLD AUTO: 9.8 FL (ref 9.2–12.9)
POTASSIUM SERPL-SCNC: 3.9 MMOL/L (ref 3.5–5.1)
PROT SERPL-MCNC: 6.4 G/DL (ref 6–8.4)
PV MEAN GRADIENT: 0.71 MMHG
RA MAJOR: 2.94 CM
RA PRESSURE: 3 MMHG
RA WIDTH: 2.7 CM
RBC # BLD AUTO: 4.6 M/UL (ref 4–5.4)
SINUS: 2.73 CM
SODIUM SERPL-SCNC: 143 MMOL/L (ref 136–145)
STJ: 2.77 CM
TDI LATERAL: 0.16 M/S
TDI SEPTAL: 0.12 M/S
TDI: 0.14 M/S
TR MAX PG: 27 MMHG
TROPONIN I SERPL DL<=0.01 NG/ML-MCNC: 0.01 NG/ML (ref 0–0.03)
TROPONIN I SERPL DL<=0.01 NG/ML-MCNC: <0.006 NG/ML (ref 0–0.03)
TV REST PULMONARY ARTERY PRESSURE: 30 MMHG
WBC # BLD AUTO: 6.07 K/UL (ref 3.9–12.7)

## 2023-01-24 PROCEDURE — G0378 HOSPITAL OBSERVATION PER HR: HCPCS

## 2023-01-24 PROCEDURE — 82607 VITAMIN B-12: CPT | Performed by: PSYCHIATRY & NEUROLOGY

## 2023-01-24 PROCEDURE — 86592 SYPHILIS TEST NON-TREP QUAL: CPT | Performed by: PSYCHIATRY & NEUROLOGY

## 2023-01-24 PROCEDURE — 36415 COLL VENOUS BLD VENIPUNCTURE: CPT | Performed by: INTERNAL MEDICINE

## 2023-01-24 PROCEDURE — 96372 THER/PROPH/DIAG INJ SC/IM: CPT | Mod: 59 | Performed by: INTERNAL MEDICINE

## 2023-01-24 PROCEDURE — 84484 ASSAY OF TROPONIN QUANT: CPT | Performed by: INTERNAL MEDICINE

## 2023-01-24 PROCEDURE — 99223 PR INITIAL HOSPITAL CARE,LEVL III: ICD-10-PCS | Mod: 25,,, | Performed by: INTERNAL MEDICINE

## 2023-01-24 PROCEDURE — 85025 COMPLETE CBC W/AUTO DIFF WBC: CPT | Performed by: INTERNAL MEDICINE

## 2023-01-24 PROCEDURE — 99222 PR INITIAL HOSPITAL CARE,LEVL II: ICD-10-PCS | Mod: 95,,, | Performed by: PSYCHIATRY & NEUROLOGY

## 2023-01-24 PROCEDURE — 83735 ASSAY OF MAGNESIUM: CPT | Performed by: INTERNAL MEDICINE

## 2023-01-24 PROCEDURE — 25000003 PHARM REV CODE 250: Performed by: INTERNAL MEDICINE

## 2023-01-24 PROCEDURE — 99222 1ST HOSP IP/OBS MODERATE 55: CPT | Mod: 95,,, | Performed by: PSYCHIATRY & NEUROLOGY

## 2023-01-24 PROCEDURE — 99223 1ST HOSP IP/OBS HIGH 75: CPT | Mod: 25,,, | Performed by: INTERNAL MEDICINE

## 2023-01-24 PROCEDURE — 84484 ASSAY OF TROPONIN QUANT: CPT | Mod: 91 | Performed by: INTERNAL MEDICINE

## 2023-01-24 PROCEDURE — 84425 ASSAY OF VITAMIN B-1: CPT | Performed by: PSYCHIATRY & NEUROLOGY

## 2023-01-24 PROCEDURE — 80053 COMPREHEN METABOLIC PANEL: CPT | Performed by: INTERNAL MEDICINE

## 2023-01-24 PROCEDURE — 96361 HYDRATE IV INFUSION ADD-ON: CPT

## 2023-01-24 PROCEDURE — 63600175 PHARM REV CODE 636 W HCPCS: Performed by: INTERNAL MEDICINE

## 2023-01-24 PROCEDURE — 84446 ASSAY OF VITAMIN E: CPT | Performed by: PSYCHIATRY & NEUROLOGY

## 2023-01-24 RX ADMIN — ENOXAPARIN SODIUM 40 MG: 40 INJECTION SUBCUTANEOUS at 04:01

## 2023-01-24 RX ADMIN — SODIUM CHLORIDE: 9 INJECTION, SOLUTION INTRAVENOUS at 09:01

## 2023-01-24 NOTE — H&P
[facility] - Emergency Dept.  [facility] Medicine  History & Physical    Patient Name: Phillip Russell  MRN: [id number]  Patient Class: OP- Observation  Admission Date: 1/23/2023  Attending Physician: Noah Hutchison MD   Primary Care Provider: YOJANA Hanson         Patient information was obtained from patient, past medical records and ER records.     Subjective:     Principal Problem:Recurrent syncope    Chief Complaint:   Chief Complaint   Patient presents with    Loss of Consciousness     Pt reports multiple syncopal episodes over 5-6 months which are starting to occur more frequently. Has been seen by pcp about episodes in the past. States has been having memory loss. Pt also having irregular cycles        HPI: Ms. Russell is a 32-year-old  female with no significant medical problems, presented to the ED complaining of frequent syncopal episode for the past six months.  States that she feels lightheaded, dizzy with walking, associated with tingling in her arms and legs, unusually falls to the ground.  Sometimes loses consciousness.  Frequent a has been increasing of late.  Reports taking gabapentin, Robaxin for chronic neck pain.  Denies chest pain, palpitations.  Episodes of presyncope have been increasing significantly, almost every time she stands up.  UDS positive for PCP.  Denies alcohol use.  CT head negative for acute intracranial pathology.  Patient has no focal neurological deficits at this time.  Alert, oriented x3, unable to lift bilateral upper and lower extremities with no deficits.  Patient was seen in the ED in October of last year with unremarkable workup.  Laboratory workup today is unremarkable.  She will be placed in observation for MRI, MRA head and neck to rule out vertebrobasilar insufficiency.  Also monitored on telemetry for any possible cardiac arrhythmia as an etiology.  ED physician discussed case with on-call cardiologist, recommended echocardiogram in a.m.  and IV fluids.    Placed in observation for recurrent syncope.      Past Medical History:   Diagnosis Date    Bulging of cervical intervertebral disc     Herniated cervical disc     Miscarriage     Neck pain, chronic        Past Surgical History:   Procedure Laterality Date    DILATION AND CURETTAGE OF UTERUS      HEMORRHOID SURGERY         Review of patient's allergies indicates:   Allergen Reactions    Ceclor [cefaclor]     Hydrocodone Itching    Chlorphen-phenyleph-hydrocodon Palpitations       No current facility-administered medications on file prior to encounter.     Current Outpatient Medications on File Prior to Encounter   Medication Sig    amitriptyline (ELAVIL) 50 MG tablet Take 50 mg by mouth every evening.    fluticasone propionate (FLONASE) 50 mcg/actuation nasal spray 2 sprays (100 mcg total) by Each Nostril route once daily.    gabapentin (NEURONTIN) 600 MG tablet Take 600 mg by mouth 3 (three) times daily.    ibuprofen (ADVIL,MOTRIN) 600 MG tablet Take 1 tablet (600 mg total) by mouth every 6 (six) hours as needed for Pain.    methocarbamol (ROBAXIN) 500 MG Tab Take 500 mg by mouth 4 (four) times daily.    [DISCONTINUED] oxycodone-acetaminophen (PERCOCET) 5-325 mg per tablet Take 1 tablet by mouth every 4 (four) hours as needed for Pain.     Family History    Reviewed and Not Pertinent       Tobacco Use    Smoking status: Never    Smokeless tobacco: Never   Substance and Sexual Activity    Alcohol use: No    Drug use: No    Sexual activity: Not on file     Review of Systems   Constitutional: Negative.  Negative for chills and fever.   HENT: Negative.  Negative for congestion, rhinorrhea, sore throat and trouble swallowing.    Eyes: Negative.  Negative for visual disturbance.   Respiratory: Negative.  Negative for cough, shortness of breath and wheezing.    Cardiovascular: Negative.  Negative for chest pain and palpitations.   Gastrointestinal: Negative.  Negative for abdominal  pain, diarrhea, nausea and vomiting.   Endocrine: Negative.    Genitourinary: Negative.  Negative for dysuria and flank pain.   Musculoskeletal: Negative.  Negative for back pain.   Skin: Negative.  Negative for rash.   Allergic/Immunologic: Negative.    Neurological:  Positive for dizziness, syncope (Frequent presyncopal episodes) and light-headedness. Negative for speech difficulty, weakness, numbness and headaches.   Hematological: Negative.    Psychiatric/Behavioral: Negative.  Negative for hallucinations. The patient is not nervous/anxious.    All other systems reviewed and are negative.  Objective:     Vital Signs (Most Recent):  Temp: 98 °F (36.7 °C) (01/23/23 1435)  Pulse: 90 (01/23/23 1718)  Resp: 16 (01/23/23 1435)  BP: 133/65 (01/23/23 1718)  SpO2: 99 % (01/23/23 1435) Vital Signs (24h Range):  Temp:  [98 °F (36.7 °C)] 98 °F (36.7 °C)  Pulse:  [78-95] 90  Resp:  [16] 16  SpO2:  [99 %] 99 %  BP: (116-133)/(65-83) 133/65        Body mass index is 23.86 kg/m².    Physical Exam  Vitals and nursing note reviewed.   Constitutional:       General: She is awake. She is not in acute distress.     Appearance: She is not ill-appearing.   HENT:      Head: Normocephalic and atraumatic.      Mouth/Throat:      Mouth: Mucous membranes are moist.   Eyes:      General: No scleral icterus.     Conjunctiva/sclera: Conjunctivae normal.   Cardiovascular:      Rate and Rhythm: Normal rate and regular rhythm.      Heart sounds: No murmur heard.  Pulmonary:      Effort: Pulmonary effort is normal. No respiratory distress.      Breath sounds: Normal breath sounds. No wheezing.   Abdominal:      Palpations: Abdomen is soft.      Tenderness: There is no abdominal tenderness.   Musculoskeletal:         General: No swelling. Normal range of motion.      Cervical back: Normal range of motion and neck supple.   Skin:     General: Skin is warm.      Coloration: Skin is not jaundiced.   Neurological:      General: No focal deficit  present.      Mental Status: She is alert and oriented to person, place, and time. Mental status is at baseline.   Psychiatric:         Attention and Perception: Attention normal.         Mood and Affect: Mood normal.         Speech: Speech normal.         Behavior: Behavior normal. Behavior is cooperative.           Significant Labs: All pertinent labs within the past 24 hours have been reviewed.  BMP:   Recent Labs   Lab 01/23/23  1715   GLU 95      K 3.9      CO2 23   BUN 9   CREATININE 1.2   CALCIUM 9.6   MG 1.7     CBC:   Recent Labs   Lab 01/23/23  1715   WBC 6.50   HGB 13.3   HCT 39.4        CMP:   Recent Labs   Lab 01/23/23  1715      K 3.9      CO2 23   GLU 95   BUN 9   CREATININE 1.2   CALCIUM 9.6   PROT 7.6   ALBUMIN 3.8   BILITOT 0.5   ALKPHOS 67   AST 18   ALT 17   ANIONGAP 11     Significant Imaging: I have reviewed all pertinent imaging results/findings within the past 24 hours.  I have reviewed and interpreted all pertinent imaging results/findings within the past 24 hours.    Imaging Results              CT Head Without Contrast (Final result)  Result time 01/23/23 17:49:19      Final result by Medardo Loving MD (01/23/23 17:49:19)                   Impression:      No acute intracranial CT abnormality.    All CT scans at this facility are performed  using dose modulation techniques as appropriate to performed exam including the following:  automated exposure control; adjustment of mA and/or kV according to the patients size (this includes techniques or standardized protocols for targeted exams where dose is matched to indication/reason for exam: i.e. extremities or head);  iterative reconstruction technique.      Electronically signed by: Medardo Loving  Date:    01/23/2023  Time:    17:49               Narrative:    EXAMINATION:  CT HEAD WITHOUT CONTRAST    CLINICAL HISTORY:  Syncope, recurrent;    TECHNIQUE:  Low dose axial CT images obtained throughout the head  without intravenous contrast. Sagittal and coronal reconstructions were performed.    COMPARISON:  None.    FINDINGS:  Intracranial compartment:    Ventricles and sulci are normal in size for age without evidence of hydrocephalus. No extra-axial blood or fluid collections.    The brain parenchyma appears normal. No parenchymal mass, hemorrhage, edema or major vascular distribution infarct.    Skull/extracranial contents (limited evaluation): No fracture. Mastoid air cells and paranasal sinuses are essentially clear.                                    I have independently reviewed and interpreted the EKG.     I have independently reviewed all pertinent labs within the past 24 hours.    I have independently reviewed, visualized and interpreted all pertinent imaging results within the past 24 hours and discussed the findings with the ED physician, Dr. Sidhu          Assessment/Plan:     * Recurrent syncope  -recurrent syncopal episode for few months.    -CT head negative for acute intracranial pathology.    -place in observation.    -check orthostatics.    -MRI brain, MRA head and neck to rule out vertebrobasilar insufficiency.  -echocardiogram in a.m..    -cardiology consult.    -monitor on telemetry  -continue NS at 125 cc/hour  -re-evaluate in a.m.        VTE Risk Mitigation (From admission, onward)         Ordered     enoxaparin injection 40 mg  Daily         01/23/23 1951                 The patient is placed in OBSERVATION status.      Noah Hutchison MD  Department of Hospital Medicine   [facility] - Emergency Dept.

## 2023-01-24 NOTE — CONSULTS
[facility] Surg  Cardiology  Consult Note    Patient Name: Phillip Russell  MRN: [id number]  Admission Date: 1/23/2023  Hospital Length of Stay: 0 days  Code Status: No Order   Attending Provider: Louise Murguia MD   Consulting Provider: Lubna Hubbard NP  Primary Care Physician: YOJANA Hanson  Principal Problem:Recurrent syncope    Patient information was obtained from patient and ER records.     Inpatient consult to Cardiology  Consult performed by: Lubna Hubbard NP  Consult ordered by: Noah Hutchison MD        Subjective:     Chief Complaint:  syncope     HPI:   Ms. Russell is a 33y/o AA female with PMHx of irregular heart rhythms and saw neurosx in 2018 for a spinal sx. She reports to [facility] ED with frequent syncopal episodes of the past few months with associated short term memory issues. Episodes of presyncope have been increasing significantly, almost every time she stands up. Pt denies any chest pain and palpitations. Cardiology consulted for syncope. Pt seen and examined today, denies CP and palpitations during exam she does endorse occasional HA. She also reports forgetfulness, she said she sent her son to the bus stop on Saturday forgetting the day of the week. Labs reviewed UDS positive for PCP (possible false positive, review home meds), troponin negative, Crt 1.0, Echo pending, MRA of brain and neck negative, MRI brain negative      Past Medical History:   Diagnosis Date    Bulging of cervical intervertebral disc     Herniated cervical disc     Miscarriage     Neck pain, chronic        Past Surgical History:   Procedure Laterality Date    DILATION AND CURETTAGE OF UTERUS      HEMORRHOID SURGERY         Review of patient's allergies indicates:   Allergen Reactions    Ceclor [cefaclor]     Hydrocodone Itching    Chlorphen-phenyleph-hydrocodon Palpitations       No current facility-administered medications on file prior to encounter.     Current Outpatient  Medications on File Prior to Encounter   Medication Sig    amitriptyline (ELAVIL) 50 MG tablet Take 50 mg by mouth every evening.    fluticasone propionate (FLONASE) 50 mcg/actuation nasal spray 2 sprays (100 mcg total) by Each Nostril route once daily.    gabapentin (NEURONTIN) 600 MG tablet Take 600 mg by mouth 3 (three) times daily.    ibuprofen (ADVIL,MOTRIN) 600 MG tablet Take 1 tablet (600 mg total) by mouth every 6 (six) hours as needed for Pain.    methocarbamol (ROBAXIN) 500 MG Tab Take 500 mg by mouth 4 (four) times daily.     Family History    None       Tobacco Use    Smoking status: Never    Smokeless tobacco: Never   Substance and Sexual Activity    Alcohol use: No    Drug use: No    Sexual activity: Not on file     Review of Systems   Constitutional: Negative.   HENT: Negative.     Eyes: Negative.    Cardiovascular:  Positive for syncope.   Respiratory: Negative.     Skin: Negative.    Musculoskeletal: Negative.    Gastrointestinal: Negative.    Genitourinary: Negative.    Neurological:  Positive for headaches.   Psychiatric/Behavioral:  Positive for memory loss.    Objective:     Vital Signs (Most Recent):  Temp: 99.3 °F (37.4 °C) (01/24/23 0911)  Pulse: 108 (01/24/23 1059)  Resp: 20 (01/24/23 0911)  BP: 111/63 (01/24/23 1059)  SpO2: 95 % (01/24/23 1059) Vital Signs (24h Range):  Temp:  [98 °F (36.7 °C)-99.3 °F (37.4 °C)] 99.3 °F (37.4 °C)  Pulse:  [] 108  Resp:  [16-20] 20  SpO2:  [95 %-100 %] 95 %  BP: (103-133)/(57-83) 111/63     Weight: 64.9 kg (143 lb)  Body mass index is 24.55 kg/m².    SpO2: 95 %       No intake or output data in the 24 hours ending 01/24/23 1155    Lines/Drains/Airways       Peripheral Intravenous Line  Duration                  Peripheral IV - Single Lumen 01/23/23 6469 20 G Anterior;Left;Proximal Forearm <1 day                    Physical Exam  Cardiovascular:      Rate and Rhythm: Normal rate.       Significant Labs: BMP:   Recent Labs   Lab 01/23/23  5403  01/24/23  0507   GLU 95 89    143   K 3.9 3.9    108   CO2 23 24   BUN 9 8   CREATININE 1.2 1.0   CALCIUM 9.6 8.5*   MG 1.7 1.8   , CMP   Recent Labs   Lab 01/23/23  1715 01/24/23  0507    143   K 3.9 3.9    108   CO2 23 24   GLU 95 89   BUN 9 8   CREATININE 1.2 1.0   CALCIUM 9.6 8.5*   PROT 7.6 6.4   ALBUMIN 3.8 3.4*   BILITOT 0.5 0.4   ALKPHOS 67 58   AST 18 18   ALT 17 14   ANIONGAP 11 11   , CBC   Recent Labs   Lab 01/23/23  1715 01/24/23  0507   WBC 6.50 6.07   HGB 13.3 12.1   HCT 39.4 36.2*    256   , INR No results for input(s): INR, PROTIME in the last 48 hours., Lipid Panel No results for input(s): CHOL, HDL, LDLCALC, TRIG, CHOLHDL in the last 48 hours., Troponin   Recent Labs   Lab 01/23/23  1715 01/24/23  0246 01/24/23  0536   TROPONINI <0.006 0.008 <0.006   , and All pertinent lab results from the last 24 hours have been reviewed.    Significant Imaging: Echocardiogram: Transthoracic echo (TTE) complete ([facility] Only):   Results for orders placed or performed during the hospital encounter of 01/23/23   Echo   Result Value Ref Range    BSA 1.71 m2    TDI SEPTAL 0.12 m/s    LV LATERAL E/E' RATIO 5.44 m/s    LV SEPTAL E/E' RATIO 7.25 m/s    LA WIDTH 2.90 cm    IVC diameter 1.50 cm    Left Ventricular Outflow Tract Mean Velocity 0.69 cm/s    Left Ventricular Outflow Tract Mean Gradient 2.11 mmHg    TDI LATERAL 0.16 m/s    LVIDd 3.66 3.5 - 6.0 cm    IVS 0.82 0.6 - 1.1 cm    Posterior Wall 0.80 0.6 - 1.1 cm    Ao root annulus 2.64 cm    LVIDs 2.58 2.1 - 4.0 cm    FS 30 28 - 44 %    LA volume 17.93 cm3    Sinus 2.73 cm    STJ 2.77 cm    Ascending aorta 2.79 cm    LV mass 82.28 g    LA size 2.13 cm    Left Ventricle Relative Wall Thickness 0.44 cm    AV mean gradient 3 mmHg    AV valve area 1.79 cm2    AV Velocity Ratio 0.79     AV index (prosthetic) 0.79     PV peak gradient 1.31 mmHg    E/A ratio 1.36     Mean e' 0.14 m/s    E wave deceleration time 141.29 msec    IVRT 76.12 msec     LVOT diameter 1.70 cm    LVOT area 2.3 cm2    LVOT peak ashish 0.96 m/s    LVOT peak VTI 19.30 cm    Ao peak ashish 1.21 m/s    Ao VTI 24.5 cm    RVOT peak ashish 0.57 m/s    RVOT peak VTI 11.7 cm    LVOT stroke volume 43.78 cm3    AV peak gradient 6 mmHg    PV mean gradient 0.71 mmHg    E/E' ratio 6.21 m/s    MV Peak E Ashish 0.87 m/s    TR Max Ashish 2.61 m/s    MV Peak A Ashish 0.64 m/s    LV Systolic Volume 24.13 mL    LV Systolic Volume Index 14.2 mL/m2    LV Diastolic Volume 56.65 mL    LV Diastolic Volume Index 33.32 mL/m2    LA Volume Index 10.5 mL/m2    LV Mass Index 48 g/m2    RA Major Axis 2.94 cm    Left Atrium Minor Axis 3.47 cm    Left Atrium Major Axis 3.36 cm    Triscuspid Valve Regurgitation Peak Gradient 27 mmHg    RA Width 2.70 cm    and EKG: reviewed    Assessment and Plan:     * Recurrent syncope  Denies palpitations and chest pain  Per patient she has been told she has irregular heart rhythm  Cont cardiac monitoring, hydration  Echo pending  Recommend OP holter monitor, follow up in cardiology clinic          VTE Risk Mitigation (From admission, onward)         Ordered     enoxaparin injection 40 mg  Daily         01/23/23 1951                Thank you for your consult. I will follow-up with patient. Please contact us if you have any additional questions.    Lubna Hubbard, NP  Cardiology   [facility] - Med Surg

## 2023-01-24 NOTE — ED PROVIDER NOTES
"SCRIBE #1 NOTE: I, Rao Lindsay, am scribing for, and in the presence of, Migel Burk Do, MD. I have scribed the entire note.       History     Chief Complaint   Patient presents with    Loss of Consciousness     Pt reports multiple syncopal episodes over 5-6 months which are starting to occur more frequently. Has been seen by pcp about episodes in the past. States has been having memory loss. Pt also having irregular cycles     Review of patient's allergies indicates:   Allergen Reactions    Ceclor [cefaclor]     Hydrocodone Itching    Chlorphen-phenyleph-hydrocodon Palpitations         History of Present Illness     HPI    1/23/2023, 6:41 PM  History obtained from the patient      History of Present Illness: Phillip Russell is a 32 y.o. female patient with a PMHx of chronic neck pain who presents to the Emergency Department for evaluation of syncopal episodes which happen multiple times per day. Pt states that nearly every time she stands up and begins to walk she feels a "rush and tingling" which she then forces herself onto a nearby sofa or bed in order to catch herself. Pt has frequent episodes with some resulting in LOC. The current episodes have been happening for 3-4 weeks. Pt has been evaluated both by her PCP and here in the ED for this complaint. Pt was evaluated on 10/5 by Dr. Ding and lab work was positive for PCP and a UTI. There was no arrhythmia or STEMI on ECG. CTA did not show any CAD and the aorta looked normal. There was no PE. The current episodes are similar but pt is now complaining of memory loss and tremors. Pt has not followed up with neurology. Symptoms are episodic and moderate in severity. No mitigating or exacerbating factors reported. No additional associated sxs reported. Patient denies any CP, SOB, drug use, n/v, weakness, and all other sxs at this time. No further complaints or concerns at this time.       Arrival mode: Personal vehicle    PCP: YOJANA Hanson "        Past Medical History:  Past Medical History:   Diagnosis Date    Bulging of cervical intervertebral disc     Herniated cervical disc     Miscarriage     Neck pain, chronic        Past Surgical History:  Past Surgical History:   Procedure Laterality Date    DILATION AND CURETTAGE OF UTERUS      HEMORRHOID SURGERY           Family History:  History reviewed. No pertinent family history.    Social History:  Social History     Tobacco Use    Smoking status: Never    Smokeless tobacco: Never   Substance and Sexual Activity    Alcohol use: No    Drug use: No    Sexual activity: Not on file        Review of Systems     Review of Systems   Constitutional:  Negative for fever.   HENT:  Negative for sore throat.    Respiratory:  Negative for shortness of breath.    Cardiovascular:  Negative for chest pain.   Gastrointestinal:  Negative for abdominal pain, nausea and vomiting.   Genitourinary:  Negative for dysuria.   Musculoskeletal:  Negative for back pain.   Skin:  Negative for rash.   Neurological:  Positive for tremors and syncope. Negative for weakness.   Hematological:  Does not bruise/bleed easily.   All other systems reviewed and are negative.   Physical Exam     Initial Vitals [01/23/23 1435]   BP Pulse Resp Temp SpO2   116/68 95 16 98 °F (36.7 °C) 99 %      MAP       --          Physical Exam  Nursing Notes and Vital Signs Reviewed.  Constitutional: Patient is in no acute distress. Well-developed and well-nourished.  Head: Atraumatic. Normocephalic.  Eyes: PERRL. EOM intact. Conjunctivae are not pale. No scleral icterus.  ENT: Mucous membranes are moist. Oropharynx is clear and symmetric.    Neck: Supple. Full ROM. No lymphadenopathy.  Cardiovascular: Regular rate. Regular rhythm. No murmurs, rubs, or gallops. Distal pulses are 2+ and symmetric. No carotid bruit.  Pulmonary/Chest: No respiratory distress. Clear to auscultation bilaterally. No wheezing or rales.  Abdominal: Soft and non-distended.  There is no  "tenderness.  No rebound, guarding, or rigidity. Good bowel sounds.  Genitourinary: No CVA tenderness  Musculoskeletal: Moves all extremities. No obvious deformities. No edema. No calf tenderness.  Skin: Warm and dry.  Neurological:  Alert, awake, and appropriate.  Normal speech.  No acute focal neurological deficits are appreciated.  Psychiatric: Normal affect. Good eye contact. Appropriate in content.     ED Course   Procedures  ED Vital Signs:  Vitals:    01/23/23 1435 01/23/23 1714 01/23/23 1716 01/23/23 1718   BP: 116/68 133/83 118/78 133/65   Pulse: 95 79 78 90   Resp: 16      Temp: 98 °F (36.7 °C)      TempSrc: Oral      SpO2: 99%      Weight:       Height: 5' 4" (1.626 m)       01/23/23 2009 01/23/23 2058   BP:  117/70   Pulse:  81   Resp:  19   Temp:     TempSrc:     SpO2:  99%   Weight: 64.9 kg (143 lb)    Height:         Abnormal Lab Results:  Labs Reviewed   CBC W/ AUTO DIFFERENTIAL - Abnormal; Notable for the following components:       Result Value    MCV 78 (*)     MCH 26.3 (*)     MPV 8.9 (*)     All other components within normal limits   DRUG SCREEN PANEL, URINE EMERGENCY - Abnormal; Notable for the following components:    Phencyclidine Presumptive Positive (*)     All other components within normal limits    Narrative:     Specimen Source->Urine   COMPREHENSIVE METABOLIC PANEL   CK   TROPONIN I   PREGNANCY TEST, URINE RAPID    Narrative:     Specimen Source->Urine   MAGNESIUM   TSH   TSH   TROPONIN I        All Lab Results:  Results for orders placed or performed during the hospital encounter of 01/23/23   CBC auto differential   Result Value Ref Range    WBC 6.50 3.90 - 12.70 K/uL    RBC 5.05 4.00 - 5.40 M/uL    Hemoglobin 13.3 12.0 - 16.0 g/dL    Hematocrit 39.4 37.0 - 48.5 %    MCV 78 (L) 82 - 98 fL    MCH 26.3 (L) 27.0 - 31.0 pg    MCHC 33.8 32.0 - 36.0 g/dL    RDW 13.0 11.5 - 14.5 %    Platelets 283 150 - 450 K/uL    MPV 8.9 (L) 9.2 - 12.9 fL    Immature Granulocytes 0.5 0.0 - 0.5 %    Gran # " (ANC) 4.0 1.8 - 7.7 K/uL    Immature Grans (Abs) 0.03 0.00 - 0.04 K/uL    Lymph # 1.9 1.0 - 4.8 K/uL    Mono # 0.5 0.3 - 1.0 K/uL    Eos # 0.0 0.0 - 0.5 K/uL    Baso # 0.02 0.00 - 0.20 K/uL    nRBC 0 0 /100 WBC    Gran % 61.8 38.0 - 73.0 %    Lymph % 29.4 18.0 - 48.0 %    Mono % 7.4 4.0 - 15.0 %    Eosinophil % 0.6 0.0 - 8.0 %    Basophil % 0.3 0.0 - 1.9 %    Differential Method Automated    Comprehensive metabolic panel   Result Value Ref Range    Sodium 138 136 - 145 mmol/L    Potassium 3.9 3.5 - 5.1 mmol/L    Chloride 104 95 - 110 mmol/L    CO2 23 23 - 29 mmol/L    Glucose 95 70 - 110 mg/dL    BUN 9 6 - 20 mg/dL    Creatinine 1.2 0.5 - 1.4 mg/dL    Calcium 9.6 8.7 - 10.5 mg/dL    Total Protein 7.6 6.0 - 8.4 g/dL    Albumin 3.8 3.5 - 5.2 g/dL    Total Bilirubin 0.5 0.1 - 1.0 mg/dL    Alkaline Phosphatase 67 55 - 135 U/L    AST 18 10 - 40 U/L    ALT 17 10 - 44 U/L    Anion Gap 11 8 - 16 mmol/L    eGFR >60 >60 mL/min/1.73 m^2   CPK   Result Value Ref Range    CPK 66 20 - 180 U/L   Troponin I   Result Value Ref Range    Troponin I <0.006 0.000 - 0.026 ng/mL   Rapid Pregnancy, Urine   Result Value Ref Range    Preg Test, Ur Negative    Drug screen panel, emergency   Result Value Ref Range    Benzodiazepines Negative Negative    Methadone metabolites Negative Negative    Cocaine (Metab.) Negative Negative    Opiate Scrn, Ur Negative Negative    Barbiturate Screen, Ur Negative Negative    Amphetamine Screen, Ur Negative Negative    THC Negative Negative    Phencyclidine Presumptive Positive (A) Negative    Creatinine, Urine 150.7 15.0 - 325.0 mg/dL    Toxicology Information SEE COMMENT    Magnesium   Result Value Ref Range    Magnesium 1.7 1.6 - 2.6 mg/dL   TSH   Result Value Ref Range    TSH 1.350 0.400 - 4.000 uIU/mL       Imaging Results:  Imaging Results              CT Head Without Contrast (Final result)  Result time 01/23/23 17:49:19      Final result by Medardo Loving MD (01/23/23 17:49:19)                    Impression:      No acute intracranial CT abnormality.    All CT scans at this facility are performed  using dose modulation techniques as appropriate to performed exam including the following:  automated exposure control; adjustment of mA and/or kV according to the patients size (this includes techniques or standardized protocols for targeted exams where dose is matched to indication/reason for exam: i.e. extremities or head);  iterative reconstruction technique.      Electronically signed by: Medardo Loving  Date:    01/23/2023  Time:    17:49               Narrative:    EXAMINATION:  CT HEAD WITHOUT CONTRAST    CLINICAL HISTORY:  Syncope, recurrent;    TECHNIQUE:  Low dose axial CT images obtained throughout the head without intravenous contrast. Sagittal and coronal reconstructions were performed.    COMPARISON:  None.    FINDINGS:  Intracranial compartment:    Ventricles and sulci are normal in size for age without evidence of hydrocephalus. No extra-axial blood or fluid collections.    The brain parenchyma appears normal. No parenchymal mass, hemorrhage, edema or major vascular distribution infarct.    Skull/extracranial contents (limited evaluation): No fracture. Mastoid air cells and paranasal sinuses are essentially clear.                                       The EKG was ordered, reviewed, and independently interpreted by the ED provider.  Interpretation time: 18:05  Rate: 86 BPM  Rhythm: normal sinus rhythm  Interpretation: Right atrial enlargment. No STEMI.         The Emergency Provider reviewed the vital signs and test results, which are outlined above.     ED Discussion     7:41 PM: Discussed pt's case with Dr. Munoz (Cardiology) who recommends observation for further evaluation and workup: echo, tele monitoring; and be seen by Cards inhouse. Give fluids overnight .    7:52 PM: Discussed case with Malinda Horton NP (Hospital Medicine). Dr. Hutchison agrees with current care and management of pt and  "accepts admission.   Admitting Service: Hospital medicine  Admitting Physician: Dr. Hutchison  Admit to: Obs tele    7:52 PM: Re-evaluated pt. I have discussed test results, shared treatment plan, and the need for admission with patient and family at bedside. Pt and family express understanding at this time and agree with all information. All questions answered. Pt and family have no further questions or concerns at this time. Pt is ready for admit.         Medical Decision Making:   Initial Assessment:   32 y.o. female patient with a PMHx of chronic neck pain who presents to the Emergency Department for evaluation of syncopal episodes which happen multiple times per day. Pt states that nearly every time she stands up and begins to walk she feels a "rush and tingling" which she then forces herself onto a nearby sofa or bed in order to catch herself. Pt has frequent episodes with some resulting in LOC. The current episodes have been happening for 3-4 weeks. Pt has been evaluated both by her PCP and here in the ED for this complaint. Pt was evaluated on 10/5 by Dr. Ding and lab work was positive for PCP and a UTI. There was no arrhythmia or STEMI on ECG. CTA did not show any CAD and the aorta looked normal. There was no PE. The current episodes are similar but pt is now complaining of memory loss and tremors. Pt has not followed up with neurology  Clinical Tests:   Lab Tests: Ordered and Reviewed  Radiological Study: Ordered and Reviewed  Medical Tests: Ordered and Reviewed  ED Management:  Pt has had numerous episodes of syncope that sound vasovagal in nature going on for months but have worsened in the past 3-4 weeks. Pt is able to walk but eventually faints. Pt does not know why urine is positive for PCP. Pt denies drug use. Pt was once rx metoprolol but states she has never taken it. Plan is to consult cardiology for further management.  Dr. Munoz with Cardiology does agree with admission and hospital medicine " services will admit the patient    Per [facility]: Apparently PCP can cause a false positive: False positive urine screens for PCP are common with tramadol, dextromethorphan, alprazolam, clonazepam, and carvedilol and may also occur with diphenhydramine.          ED Medication(s):  Medications   acetaminophen tablet 650 mg (has no administration in time range)   ondansetron injection 4 mg (has no administration in time range)   guaiFENesin 100 mg/5 ml syrup 200 mg (has no administration in time range)   aluminum-magnesium hydroxide-simethicone 200-200-20 mg/5 mL suspension 30 mL (30 mLs Oral Given 1/23/23 2110)   albuterol-ipratropium 2.5 mg-0.5 mg/3 mL nebulizer solution 3 mL (has no administration in time range)   enoxaparin injection 40 mg (40 mg Subcutaneous Given 1/23/23 2110)   0.9%  NaCl infusion ( Intravenous New Bag 1/23/23 2110)   0.9%  NaCl infusion (0 mLs Intravenous Stopped 1/23/23 2112)       New Prescriptions    No medications on file               Scribe Attestation:   Scribe #1: I performed the above scribed service and the documentation accurately describes the services I performed. I attest to the accuracy of the note.     Attending:   Physician Attestation Statement for Scribe #1: I, Migel Burk Do, MD, personally performed the services described in this documentation, as scribed by Rao Lindsay, in my presence, and it is both accurate and complete.           Clinical Impression       ICD-10-CM ICD-9-CM   1. Syncope and collapse  R55 780.2   2. Syncope  R55 780.2   3. Recurrent syncope  R55 780.2       Disposition:   Disposition: Placed in Observation  Condition: Fair       Migel Burk Do, MD  01/24/23 0013       Migel Burk Do, MD  01/24/23 0015

## 2023-01-24 NOTE — ASSESSMENT & PLAN NOTE
Denies palpitations and chest pain  Per patient she has been told she has irregular heart rhythm  Cont cardiac monitoring, hydration  Echo pending  Recommend OP holter monitor, follow up in cardiology clinic

## 2023-01-24 NOTE — SUBJECTIVE & OBJECTIVE
Past Medical History:   Diagnosis Date    Bulging of cervical intervertebral disc     Herniated cervical disc     Miscarriage     Neck pain, chronic        Past Surgical History:   Procedure Laterality Date    DILATION AND CURETTAGE OF UTERUS      HEMORRHOID SURGERY         Review of patient's allergies indicates:   Allergen Reactions    Ceclor [cefaclor]     Hydrocodone Itching    Chlorphen-phenyleph-hydrocodon Palpitations       No current facility-administered medications on file prior to encounter.     Current Outpatient Medications on File Prior to Encounter   Medication Sig    amitriptyline (ELAVIL) 50 MG tablet Take 50 mg by mouth every evening.    fluticasone propionate (FLONASE) 50 mcg/actuation nasal spray 2 sprays (100 mcg total) by Each Nostril route once daily.    gabapentin (NEURONTIN) 600 MG tablet Take 600 mg by mouth 3 (three) times daily.    ibuprofen (ADVIL,MOTRIN) 600 MG tablet Take 1 tablet (600 mg total) by mouth every 6 (six) hours as needed for Pain.    methocarbamol (ROBAXIN) 500 MG Tab Take 500 mg by mouth 4 (four) times daily.    [DISCONTINUED] oxycodone-acetaminophen (PERCOCET) 5-325 mg per tablet Take 1 tablet by mouth every 4 (four) hours as needed for Pain.     Family History    None       Tobacco Use    Smoking status: Never    Smokeless tobacco: Never   Substance and Sexual Activity    Alcohol use: No    Drug use: No    Sexual activity: Not on file     Review of Systems   Constitutional: Negative.  Negative for chills and fever.   HENT: Negative.  Negative for congestion, rhinorrhea, sore throat and trouble swallowing.    Eyes: Negative.  Negative for visual disturbance.   Respiratory: Negative.  Negative for cough, shortness of breath and wheezing.    Cardiovascular: Negative.  Negative for chest pain and palpitations.   Gastrointestinal: Negative.  Negative for abdominal pain, diarrhea, nausea and vomiting.   Endocrine: Negative.    Genitourinary: Negative.  Negative for dysuria  and flank pain.   Musculoskeletal: Negative.  Negative for back pain.   Skin: Negative.  Negative for rash.   Allergic/Immunologic: Negative.    Neurological:  Positive for dizziness, syncope (Frequent presyncopal episodes) and light-headedness. Negative for speech difficulty, weakness, numbness and headaches.   Hematological: Negative.    Psychiatric/Behavioral: Negative.  Negative for hallucinations. The patient is not nervous/anxious.    All other systems reviewed and are negative.  Objective:     Vital Signs (Most Recent):  Temp: 98 °F (36.7 °C) (01/23/23 1435)  Pulse: 90 (01/23/23 1718)  Resp: 16 (01/23/23 1435)  BP: 133/65 (01/23/23 1718)  SpO2: 99 % (01/23/23 1435) Vital Signs (24h Range):  Temp:  [98 °F (36.7 °C)] 98 °F (36.7 °C)  Pulse:  [78-95] 90  Resp:  [16] 16  SpO2:  [99 %] 99 %  BP: (116-133)/(65-83) 133/65        Body mass index is 23.86 kg/m².    Physical Exam  Vitals and nursing note reviewed.   Constitutional:       General: She is awake. She is not in acute distress.     Appearance: She is not ill-appearing.   HENT:      Head: Normocephalic and atraumatic.      Mouth/Throat:      Mouth: Mucous membranes are moist.   Eyes:      General: No scleral icterus.     Conjunctiva/sclera: Conjunctivae normal.   Cardiovascular:      Rate and Rhythm: Normal rate and regular rhythm.      Heart sounds: No murmur heard.  Pulmonary:      Effort: Pulmonary effort is normal. No respiratory distress.      Breath sounds: Normal breath sounds. No wheezing.   Abdominal:      Palpations: Abdomen is soft.      Tenderness: There is no abdominal tenderness.   Musculoskeletal:         General: No swelling. Normal range of motion.      Cervical back: Normal range of motion and neck supple.   Skin:     General: Skin is warm.      Coloration: Skin is not jaundiced.   Neurological:      General: No focal deficit present.      Mental Status: She is alert and oriented to person, place, and time. Mental status is at baseline.    Psychiatric:         Attention and Perception: Attention normal.         Mood and Affect: Mood normal.         Speech: Speech normal.         Behavior: Behavior normal. Behavior is cooperative.           Significant Labs: All pertinent labs within the past 24 hours have been reviewed.  BMP:   Recent Labs   Lab 01/23/23  1715   GLU 95      K 3.9      CO2 23   BUN 9   CREATININE 1.2   CALCIUM 9.6   MG 1.7     CBC:   Recent Labs   Lab 01/23/23  1715   WBC 6.50   HGB 13.3   HCT 39.4        CMP:   Recent Labs   Lab 01/23/23  1715      K 3.9      CO2 23   GLU 95   BUN 9   CREATININE 1.2   CALCIUM 9.6   PROT 7.6   ALBUMIN 3.8   BILITOT 0.5   ALKPHOS 67   AST 18   ALT 17   ANIONGAP 11     Significant Imaging: I have reviewed all pertinent imaging results/findings within the past 24 hours.  I have reviewed and interpreted all pertinent imaging results/findings within the past 24 hours.    Imaging Results              CT Head Without Contrast (Final result)  Result time 01/23/23 17:49:19      Final result by Medardo Loving MD (01/23/23 17:49:19)                   Impression:      No acute intracranial CT abnormality.    All CT scans at this facility are performed  using dose modulation techniques as appropriate to performed exam including the following:  automated exposure control; adjustment of mA and/or kV according to the patients size (this includes techniques or standardized protocols for targeted exams where dose is matched to indication/reason for exam: i.e. extremities or head);  iterative reconstruction technique.      Electronically signed by: Medardo Loving  Date:    01/23/2023  Time:    17:49               Narrative:    EXAMINATION:  CT HEAD WITHOUT CONTRAST    CLINICAL HISTORY:  Syncope, recurrent;    TECHNIQUE:  Low dose axial CT images obtained throughout the head without intravenous contrast. Sagittal and coronal reconstructions were  performed.    COMPARISON:  None.    FINDINGS:  Intracranial compartment:    Ventricles and sulci are normal in size for age without evidence of hydrocephalus. No extra-axial blood or fluid collections.    The brain parenchyma appears normal. No parenchymal mass, hemorrhage, edema or major vascular distribution infarct.    Skull/extracranial contents (limited evaluation): No fracture. Mastoid air cells and paranasal sinuses are essentially clear.                                    I have independently reviewed and interpreted the EKG.     I have independently reviewed all pertinent labs within the past 24 hours.    I have independently reviewed, visualized and interpreted all pertinent imaging results within the past 24 hours and discussed the findings with the ED physician, Dr. Sidhu

## 2023-01-24 NOTE — HPI
Ms. Russell is a 32-year-old  female with no significant medical problems, presented to the ED complaining of frequent syncopal episode for the past six months.  States that she feels lightheaded, dizzy with walking, associated with tingling in her arms and legs, unusually falls to the ground.  Sometimes loses consciousness.  Frequent a has been increasing of late.  Reports taking gabapentin, Robaxin for chronic neck pain.  Denies chest pain, palpitations.  Episodes of presyncope have been increasing significantly, almost every time she stands up.  UDS positive for PCP.  Denies alcohol use.  CT head negative for acute intracranial pathology.  Patient has no focal neurological deficits at this time.  Alert, oriented x3, unable to lift bilateral upper and lower extremities with no deficits.  Patient was seen in the ED in October of last year with unremarkable workup.  Laboratory workup today is unremarkable.  She will be placed in observation for MRI, MRA head and neck to rule out vertebrobasilar insufficiency.  Also monitored on telemetry for any possible cardiac arrhythmia as an etiology.  ED physician discussed case with on-call cardiologist, recommended echocardiogram in a.m. and IV fluids.    Placed in observation for recurrent syncope.

## 2023-01-24 NOTE — HPI
Ms. Russell is a 33y/o AA female with PMHx of irregular heart rhythms and saw neurosx in 2018 for a spinal sx. She reports to [facility] ED with frequent syncopal episodes of the past few months with associated short term memory issues. Episodes of presyncope have been increasing significantly, almost every time she stands up. Pt denies any chest pain and palpitations. Cardiology consulted for syncope. Pt seen and examined today, denies CP and palpitations during exam she does endorse occasional HA. She also reports forgetfulness, she said she sent her son to the bus stop on Saturday forgetting the day of the week. Labs reviewed UDS positive for PCP (possible false positive, review home meds), troponin negative, Crt 1.0, Echo pending, MRA of brain and neck negative, MRI brain negative

## 2023-01-24 NOTE — SUBJECTIVE & OBJECTIVE
Past Medical History:   Diagnosis Date    Bulging of cervical intervertebral disc     Herniated cervical disc     Miscarriage     Neck pain, chronic        Past Surgical History:   Procedure Laterality Date    DILATION AND CURETTAGE OF UTERUS      HEMORRHOID SURGERY         Review of patient's allergies indicates:   Allergen Reactions    Ceclor [cefaclor]     Hydrocodone Itching    Chlorphen-phenyleph-hydrocodon Palpitations       No current facility-administered medications on file prior to encounter.     Current Outpatient Medications on File Prior to Encounter   Medication Sig    amitriptyline (ELAVIL) 50 MG tablet Take 50 mg by mouth every evening.    fluticasone propionate (FLONASE) 50 mcg/actuation nasal spray 2 sprays (100 mcg total) by Each Nostril route once daily.    gabapentin (NEURONTIN) 600 MG tablet Take 600 mg by mouth 3 (three) times daily.    ibuprofen (ADVIL,MOTRIN) 600 MG tablet Take 1 tablet (600 mg total) by mouth every 6 (six) hours as needed for Pain.    methocarbamol (ROBAXIN) 500 MG Tab Take 500 mg by mouth 4 (four) times daily.     Family History    None       Tobacco Use    Smoking status: Never    Smokeless tobacco: Never   Substance and Sexual Activity    Alcohol use: No    Drug use: No    Sexual activity: Not on file     Review of Systems   Constitutional: Negative.   HENT: Negative.     Eyes: Negative.    Cardiovascular:  Positive for syncope.   Respiratory: Negative.     Skin: Negative.    Musculoskeletal: Negative.    Gastrointestinal: Negative.    Genitourinary: Negative.    Neurological:  Positive for headaches.   Psychiatric/Behavioral:  Positive for memory loss.    Objective:     Vital Signs (Most Recent):  Temp: 99.3 °F (37.4 °C) (01/24/23 0911)  Pulse: 108 (01/24/23 1059)  Resp: 20 (01/24/23 0911)  BP: 111/63 (01/24/23 1059)  SpO2: 95 % (01/24/23 1059) Vital Signs (24h Range):  Temp:  [98 °F (36.7 °C)-99.3 °F (37.4 °C)] 99.3 °F (37.4 °C)  Pulse:  [] 108  Resp:  [16-20]  20  SpO2:  [95 %-100 %] 95 %  BP: (103-133)/(57-83) 111/63     Weight: 64.9 kg (143 lb)  Body mass index is 24.55 kg/m².    SpO2: 95 %       No intake or output data in the 24 hours ending 01/24/23 1155    Lines/Drains/Airways       Peripheral Intravenous Line  Duration                  Peripheral IV - Single Lumen 01/23/23 1719 20 G Anterior;Left;Proximal Forearm <1 day                    Physical Exam  Cardiovascular:      Rate and Rhythm: Normal rate.       Significant Labs: BMP:   Recent Labs   Lab 01/23/23  1715 01/24/23  0507   GLU 95 89    143   K 3.9 3.9    108   CO2 23 24   BUN 9 8   CREATININE 1.2 1.0   CALCIUM 9.6 8.5*   MG 1.7 1.8   , CMP   Recent Labs   Lab 01/23/23  1715 01/24/23  0507    143   K 3.9 3.9    108   CO2 23 24   GLU 95 89   BUN 9 8   CREATININE 1.2 1.0   CALCIUM 9.6 8.5*   PROT 7.6 6.4   ALBUMIN 3.8 3.4*   BILITOT 0.5 0.4   ALKPHOS 67 58   AST 18 18   ALT 17 14   ANIONGAP 11 11   , CBC   Recent Labs   Lab 01/23/23  1715 01/24/23  0507   WBC 6.50 6.07   HGB 13.3 12.1   HCT 39.4 36.2*    256   , INR No results for input(s): INR, PROTIME in the last 48 hours., Lipid Panel No results for input(s): CHOL, HDL, LDLCALC, TRIG, CHOLHDL in the last 48 hours., Troponin   Recent Labs   Lab 01/23/23  1715 01/24/23  0246 01/24/23  0536   TROPONINI <0.006 0.008 <0.006   , and All pertinent lab results from the last 24 hours have been reviewed.    Significant Imaging: Echocardiogram: Transthoracic echo (TTE) complete (Cupid Only):   Results for orders placed or performed during the hospital encounter of 01/23/23   Echo   Result Value Ref Range    BSA 1.71 m2    TDI SEPTAL 0.12 m/s    LV LATERAL E/E' RATIO 5.44 m/s    LV SEPTAL E/E' RATIO 7.25 m/s    LA WIDTH 2.90 cm    IVC diameter 1.50 cm    Left Ventricular Outflow Tract Mean Velocity 0.69 cm/s    Left Ventricular Outflow Tract Mean Gradient 2.11 mmHg    TDI LATERAL 0.16 m/s    LVIDd 3.66 3.5 - 6.0 cm    IVS 0.82 0.6 - 1.1  cm    Posterior Wall 0.80 0.6 - 1.1 cm    Ao root annulus 2.64 cm    LVIDs 2.58 2.1 - 4.0 cm    FS 30 28 - 44 %    LA volume 17.93 cm3    Sinus 2.73 cm    STJ 2.77 cm    Ascending aorta 2.79 cm    LV mass 82.28 g    LA size 2.13 cm    Left Ventricle Relative Wall Thickness 0.44 cm    AV mean gradient 3 mmHg    AV valve area 1.79 cm2    AV Velocity Ratio 0.79     AV index (prosthetic) 0.79     PV peak gradient 1.31 mmHg    E/A ratio 1.36     Mean e' 0.14 m/s    E wave deceleration time 141.29 msec    IVRT 76.12 msec    LVOT diameter 1.70 cm    LVOT area 2.3 cm2    LVOT peak ashish 0.96 m/s    LVOT peak VTI 19.30 cm    Ao peak ashish 1.21 m/s    Ao VTI 24.5 cm    RVOT peak ashish 0.57 m/s    RVOT peak VTI 11.7 cm    LVOT stroke volume 43.78 cm3    AV peak gradient 6 mmHg    PV mean gradient 0.71 mmHg    E/E' ratio 6.21 m/s    MV Peak E Ashish 0.87 m/s    TR Max Ashish 2.61 m/s    MV Peak A Ashish 0.64 m/s    LV Systolic Volume 24.13 mL    LV Systolic Volume Index 14.2 mL/m2    LV Diastolic Volume 56.65 mL    LV Diastolic Volume Index 33.32 mL/m2    LA Volume Index 10.5 mL/m2    LV Mass Index 48 g/m2    RA Major Axis 2.94 cm    Left Atrium Minor Axis 3.47 cm    Left Atrium Major Axis 3.36 cm    Triscuspid Valve Regurgitation Peak Gradient 27 mmHg    RA Width 2.70 cm    and EKG: reviewed

## 2023-01-24 NOTE — CONSULTS
[facility]  Neurology  Consult Note    Patient Name: Phillip Russell  MRN: [id number]  Admission Date: 1/23/2023  Hospital Length of Stay: 0 days  Code Status: No Order   Attending Provider: Louise Murguia MD   Consulting Provider: Marcus Cadena MD  Primary Care Physician: LINNEA HansonP-C  Principal Problem:Recurrent syncope    Inpatient consult to Neurology  Consult performed by: Marcus Cadena MD  Consult ordered by: Garrick Connor MD      Subjective:     Chief Complaint: Memory loss    HPI: 31 y/o female with no reported medical Hx comes to ED due to episodes of LOC, tingling all over the body, short-term memory difficulties for six months. Pt reports that she fells lightheaded when standing up and at times she has lost consciousness. Also repots intermittent tingling sensation from head to toe. Denies hallucinations, unilateral weakness or numbness, bowel/bladder incontinence.    Past Medical History:   Diagnosis Date    Bulging of cervical intervertebral disc     Herniated cervical disc     Miscarriage     Neck pain, chronic        Past Surgical History:   Procedure Laterality Date    DILATION AND CURETTAGE OF UTERUS      HEMORRHOID SURGERY         Review of patient's allergies indicates:   Allergen Reactions    Ceclor [cefaclor]     Hydrocodone Itching    Chlorphen-phenyleph-hydrocodon Palpitations       Current Neurological Medications:     No current facility-administered medications on file prior to encounter.     Current Outpatient Medications on File Prior to Encounter   Medication Sig    amitriptyline (ELAVIL) 50 MG tablet Take 50 mg by mouth every evening.    fluticasone propionate (FLONASE) 50 mcg/actuation nasal spray 2 sprays (100 mcg total) by Each Nostril route once daily.    gabapentin (NEURONTIN) 600 MG tablet Take 600 mg by mouth 3 (three) times daily.    ibuprofen (ADVIL,MOTRIN) 600 MG tablet Take 1 tablet (600 mg total) by mouth every 6 (six) hours as needed for Pain.     methocarbamol (ROBAXIN) 500 MG Tab Take 500 mg by mouth 4 (four) times daily.      Family History    None       Tobacco Use    Smoking status: Never    Smokeless tobacco: Never   Substance and Sexual Activity    Alcohol use: No    Drug use: No    Sexual activity: Not on file     Review of Systems   Constitutional:  Positive for fatigue. Negative for fever.   HENT:  Negative for trouble swallowing.    Eyes:  Negative for photophobia.   Respiratory:  Negative for shortness of breath.    Cardiovascular:  Negative for chest pain.   Gastrointestinal:  Negative for abdominal pain.   Genitourinary:  Negative for dysuria.   Musculoskeletal:  Negative for back pain.   Neurological:  Negative for headaches.   Psychiatric/Behavioral:  Negative for confusion.    Objective:     Vital Signs (Most Recent):  Temp: 98.1 °F (36.7 °C) (01/24/23 1455)  Pulse: 101 (01/24/23 1455)  Resp: 16 (01/24/23 1455)  BP: 112/70 (01/24/23 1455)  SpO2: 98 % (01/24/23 1455) Vital Signs (24h Range):  Temp:  [98.1 °F (36.7 °C)-99.3 °F (37.4 °C)] 98.1 °F (36.7 °C)  Pulse:  [] 101  Resp:  [16-20] 16  SpO2:  [95 %-100 %] 98 %  BP: (103-133)/(57-83) 112/70     Weight: 64.9 kg (143 lb)  Body mass index is 24.55 kg/m².    Physical Exam  Constitutional:       General: She is not in acute distress.  Pulmonary:      Effort: No respiratory distress.   Psychiatric:         Speech: Speech normal.       NEUROLOGICAL EXAMINATION:     MENTAL STATUS   Registration: recalls 1 of 3 objects. Recall at 5 minutes: recalls 2 of 3 objects.   Speech: speech is normal   Level of consciousness: alert  Able to name object. Able to repeat.        Oriented to person, place, year/month     CRANIAL NERVES     CN III, IV, VI   Conjugate gaze: present    CN VII   Right facial weakness: none  Left facial weakness: none    CN XII   Tongue deviation: none    MOTOR EXAM        AROM of UE's and LE's against gravity; no drift     Significant Labs: CBC:   Recent Labs   Lab  01/23/23  1715 01/24/23  0507   WBC 6.50 6.07   HGB 13.3 12.1   HCT 39.4 36.2*    256     CMP:   Recent Labs   Lab 01/23/23  1715 01/24/23  0507   GLU 95 89    143   K 3.9 3.9    108   CO2 23 24   BUN 9 8   CREATININE 1.2 1.0   CALCIUM 9.6 8.5*   MG 1.7 1.8   PROT 7.6 6.4   ALBUMIN 3.8 3.4*   BILITOT 0.5 0.4   ALKPHOS 67 58   AST 18 18   ALT 17 14   ANIONGAP 11 11       Significant Imaging: I have reviewed all pertinent imaging results/findings within the past 24 hours.    MRI brain  FINDINGS:  There is no restricted diffusion. The brain demonstrates normal signal intensity and morphology.  No intracranial hemorrhage or mass effect.     The ventricles and sulci are normal in size and configuration. Midline structures are normal. There are preserved arterial flow-voids on T2 weighted imaging. The paranasal sinuses and mastoid air cells are clear.     No abnormal marrow signal is identified.     Impression:     No significant MRI abnormality of the brain.        Electronically signed by: Geronimo Hernandez Jr., MD  Date:                                            01/24/2023  Time:                                           08:59    Assessment and Plan:     31 y/o female consulted for memory disturbances/syncope    Memory disturbance: pt with no findings on MRI/MRA hat may explain her presentation.  To date workup has been unremarkable except for UDS positive for PCP (several substance /meds can result in false positive for PCP).  B1, B12, RP, E levels.  EEG.  Wean off of gabapentin ,and muscle relaxants or any potential sedating meds       This is a consult performed through audio-visual using [employer]. Pt and provider are in different locations. History and physical exam are limited due to the nature of this encounter.       Active Diagnoses:    Diagnosis Date Noted POA    PRINCIPAL PROBLEM:  Recurrent syncope [R55] 01/23/2023 Yes      Problems Resolved During this Admission:       VTE Risk  Mitigation (From admission, onward)           Ordered     enoxaparin injection 40 mg  Daily         01/23/23 1951                    Thank you for your consult. I will follow-up with patient. Please contact us if you have any additional questions.    Marcus Cadena MD  Neurology  [facility]

## 2023-01-24 NOTE — ASSESSMENT & PLAN NOTE
-recurrent syncopal episode for few months.    -CT head, MRI brain MRA head/neck negative for acute intracranial pathology.    -continue observation.    -echocardiogram EF 60% normal diastolic function  -cardiology consulted and recommend continue hydration and cardiac monitoring with outpatient holter.    -monitor on telemetry  -continue IVFs  -repeat orthostatic vitals in a.m. if still positive may need MIYA hose   -re-evaluate in a.m.     [SOB] : shortness of breath [Negative] : Heme/Lymph

## 2023-01-24 NOTE — SUBJECTIVE & OBJECTIVE
Interval History: patient still orthostatic. Increase in pulse noted. Continue fluids. Monitor on tele overnight.     Review of Systems  Objective:     Vital Signs (Most Recent):  Temp: 99.3 °F (37.4 °C) (01/24/23 0911)  Pulse: 108 (01/24/23 1059)  Resp: 20 (01/24/23 0911)  BP: 111/63 (01/24/23 1059)  SpO2: 95 % (01/24/23 1059) Vital Signs (24h Range):  Temp:  [98 °F (36.7 °C)-99.3 °F (37.4 °C)] 99.3 °F (37.4 °C)  Pulse:  [] 108  Resp:  [16-20] 20  SpO2:  [95 %-100 %] 95 %  BP: (103-133)/(57-83) 111/63     Weight: 64.9 kg (143 lb)  Body mass index is 24.55 kg/m².    Intake/Output Summary (Last 24 hours) at 1/24/2023 1434  Last data filed at 1/24/2023 1221  Gross per 24 hour   Intake 0 ml   Output --   Net 0 ml      Physical Exam  HENT:      Head: Normocephalic and atraumatic.   Cardiovascular:      Rate and Rhythm: Normal rate and regular rhythm.      Heart sounds: No murmur heard.  Pulmonary:      Effort: Pulmonary effort is normal. No respiratory distress.      Breath sounds: Normal breath sounds. No wheezing.   Abdominal:      General: Bowel sounds are normal. There is no distension.      Palpations: Abdomen is soft.      Tenderness: There is no abdominal tenderness.   Musculoskeletal:         General: No swelling.   Skin:     General: Skin is warm and dry.   Neurological:      Mental Status: She is alert and oriented to person, place, and time. Mental status is at baseline.       Significant Labs: All pertinent labs within the past 24 hours have been reviewed.  CBC:   Recent Labs   Lab 01/23/23  1715 01/24/23  0507   WBC 6.50 6.07   HGB 13.3 12.1   HCT 39.4 36.2*    256     CMP:   Recent Labs   Lab 01/23/23  1715 01/24/23  0507    143   K 3.9 3.9    108   CO2 23 24   GLU 95 89   BUN 9 8   CREATININE 1.2 1.0   CALCIUM 9.6 8.5*   PROT 7.6 6.4   ALBUMIN 3.8 3.4*   BILITOT 0.5 0.4   ALKPHOS 67 58   AST 18 18   ALT 17 14   ANIONGAP 11 11       Troponin:   Recent Labs   Lab 01/23/23  4073  01/24/23  0246 01/24/23  0536   TROPONINI <0.006 0.008 <0.006       Significant Imaging: I have reviewed all pertinent imaging results/findings within the past 24 hours.

## 2023-01-24 NOTE — PROGRESS NOTES
[facility] Medicine  Progress Note    Patient Name: Phillip Russell  MRN: [id number]  Patient Class: OP- Observation   Admission Date: 1/23/2023  Length of Stay: 0 days  Attending Physician: Louise Murguia MD  Primary Care Provider: YOJANA Hanson        Subjective:     Principal Problem:Recurrent syncope        HPI:  Ms. Russell is a 32-year-old  female with no significant medical problems, presented to the ED complaining of frequent syncopal episode for the past six months.  States that she feels lightheaded, dizzy with walking, associated with tingling in her arms and legs, unusually falls to the ground.  Sometimes loses consciousness.  Frequent a has been increasing of late.  Reports taking gabapentin, Robaxin for chronic neck pain.  Denies chest pain, palpitations.  Episodes of presyncope have been increasing significantly, almost every time she stands up.  UDS positive for PCP.  Denies alcohol use.  CT head negative for acute intracranial pathology.  Patient has no focal neurological deficits at this time.  Alert, oriented x3, unable to lift bilateral upper and lower extremities with no deficits.  Patient was seen in the ED in October of last year with unremarkable workup.  Laboratory workup today is unremarkable.  She will be placed in observation for MRI, MRA head and neck to rule out vertebrobasilar insufficiency.  Also monitored on telemetry for any possible cardiac arrhythmia as an etiology.  ED physician discussed case with on-call cardiologist, recommended echocardiogram in a.m. and IV fluids.    Placed in observation for recurrent syncope.      Overview/Hospital Course:    Interval History: patient still orthostatic. Increase in pulse noted. Continue fluids. Monitor on tele overnight.     Review of Systems  Objective:     Vital Signs (Most Recent):  Temp: 99.3 °F (37.4 °C) (01/24/23 0911)  Pulse: 108 (01/24/23 1059)  Resp: 20 (01/24/23 0911)  BP:  111/63 (01/24/23 1059)  SpO2: 95 % (01/24/23 1059) Vital Signs (24h Range):  Temp:  [98 °F (36.7 °C)-99.3 °F (37.4 °C)] 99.3 °F (37.4 °C)  Pulse:  [] 108  Resp:  [16-20] 20  SpO2:  [95 %-100 %] 95 %  BP: (103-133)/(57-83) 111/63     Weight: 64.9 kg (143 lb)  Body mass index is 24.55 kg/m².    Intake/Output Summary (Last 24 hours) at 1/24/2023 1434  Last data filed at 1/24/2023 1221  Gross per 24 hour   Intake 0 ml   Output --   Net 0 ml      Physical Exam  HENT:      Head: Normocephalic and atraumatic.   Cardiovascular:      Rate and Rhythm: Normal rate and regular rhythm.      Heart sounds: No murmur heard.  Pulmonary:      Effort: Pulmonary effort is normal. No respiratory distress.      Breath sounds: Normal breath sounds. No wheezing.   Abdominal:      General: Bowel sounds are normal. There is no distension.      Palpations: Abdomen is soft.      Tenderness: There is no abdominal tenderness.   Musculoskeletal:         General: No swelling.   Skin:     General: Skin is warm and dry.   Neurological:      Mental Status: She is alert and oriented to person, place, and time. Mental status is at baseline.       Significant Labs: All pertinent labs within the past 24 hours have been reviewed.  CBC:   Recent Labs   Lab 01/23/23  1715 01/24/23  0507   WBC 6.50 6.07   HGB 13.3 12.1   HCT 39.4 36.2*    256     CMP:   Recent Labs   Lab 01/23/23  1715 01/24/23  0507    143   K 3.9 3.9    108   CO2 23 24   GLU 95 89   BUN 9 8   CREATININE 1.2 1.0   CALCIUM 9.6 8.5*   PROT 7.6 6.4   ALBUMIN 3.8 3.4*   BILITOT 0.5 0.4   ALKPHOS 67 58   AST 18 18   ALT 17 14   ANIONGAP 11 11       Troponin:   Recent Labs   Lab 01/23/23  1715 01/24/23  0246 01/24/23  0536   TROPONINI <0.006 0.008 <0.006       Significant Imaging: I have reviewed all pertinent imaging results/findings within the past 24 hours.      Assessment/Plan:      * Recurrent syncope  -recurrent syncopal episode for few months.    -CT head, MRI  brain MRA head/neck negative for acute intracranial pathology.    -continue observation.    -echocardiogram EF 60% normal diastolic function  -cardiology consulted and recommend continue hydration and cardiac monitoring with outpatient holter.    -monitor on telemetry  -continue IVFs  -repeat orthostatic vitals in a.m. if still positive may need MIYA hose   -re-evaluate in a.m.        VTE Risk Mitigation (From admission, onward)         Ordered     enoxaparin injection 40 mg  Daily         01/23/23 1951                Discharge Planning   CASEY:      Code Status: Not on file   Is the patient medically ready for discharge?:     Reason for patient still in hospital (select all that apply): Patient trending condition, Treatment and Consult recommendations                     Louise Murguia MD  Department of Hospital Medicine   [facility]

## 2023-01-25 VITALS
OXYGEN SATURATION: 99 % | DIASTOLIC BLOOD PRESSURE: 63 MMHG | TEMPERATURE: 98 F | HEART RATE: 92 BPM | BODY MASS INDEX: 27.67 KG/M2 | RESPIRATION RATE: 16 BRPM | WEIGHT: 162.06 LBS | SYSTOLIC BLOOD PRESSURE: 118 MMHG | HEIGHT: 64 IN

## 2023-01-25 LAB
RPR SER QL: NORMAL
VIT B12 SERPL-MCNC: 305 PG/ML (ref 210–950)

## 2023-01-25 PROCEDURE — G0378 HOSPITAL OBSERVATION PER HR: HCPCS

## 2023-01-25 PROCEDURE — 99232 SBSQ HOSP IP/OBS MODERATE 35: CPT | Mod: ,,,

## 2023-01-25 PROCEDURE — 99232 PR SUBSEQUENT HOSPITAL CARE,LEVL II: ICD-10-PCS | Mod: ,,,

## 2023-01-25 RX ORDER — METHOCARBAMOL 500 MG/1
500 TABLET, FILM COATED ORAL 2 TIMES DAILY
Start: 2023-01-25

## 2023-01-25 RX ORDER — GABAPENTIN 600 MG/1
300 TABLET ORAL 3 TIMES DAILY
Start: 2023-01-25

## 2023-01-25 NOTE — PLAN OF CARE
Problem: Adult Inpatient Plan of Care  Goal: Plan of Care Review  Outcome: Ongoing, Progressing     Problem: Fall Injury Risk  Goal: Absence of Fall and Fall-Related Injury  Outcome: Ongoing, Progressing     Problem: UTI (Urinary Tract Infection)  Goal: Improved Infection Symptoms  Outcome: Ongoing, Progressing     Patient remains free from injury. Safety precautions maintained. No s/s of acute distress. Cardiac monitoring in place. Neuro checks q4 continued this shift. Orthostatic vitals continued this shift. Chart and orders review completed. Pt education about care completed.

## 2023-01-25 NOTE — PROGRESS NOTES
[facility] Surg  Cardiology  Progress Note    Patient Name: Phillip Russell  MRN: [id number]  Admission Date: 1/23/2023  Hospital Length of Stay: 0 days  Code Status: No Order   Attending Physician: No att. providers found   Primary Care Physician: YOJANA Hanson  Expected Discharge Date: 1/25/2023  Principal Problem:Recurrent syncope    Subjective:     Hospital Course:   1/25/23 Pt seen and examined today feels ok. Labs reviewed stable          Review of Systems   Constitutional: Negative.   HENT: Negative.     Eyes: Negative.    Cardiovascular: Negative.    Respiratory: Negative.     Skin: Negative.    Musculoskeletal: Negative.    Gastrointestinal: Negative.    Genitourinary: Negative.    Neurological: Negative.    Psychiatric/Behavioral: Negative.     Objective:     Vital Signs (Most Recent):  Temp: 98.2 °F (36.8 °C) (01/25/23 0736)  Pulse: 92 (01/25/23 0905)  Resp: 16 (01/25/23 0736)  BP: 118/63 (01/25/23 0736)  SpO2: 99 % (01/25/23 0736) Vital Signs (24h Range):  Temp:  [98 °F (36.7 °C)-98.3 °F (36.8 °C)] 98.2 °F (36.8 °C)  Pulse:  [] 92  Resp:  [16-18] 16  SpO2:  [98 %-100 %] 99 %  BP: (112-133)/(63-89) 118/63     Weight: 73.5 kg (162 lb 0.6 oz)  Body mass index is 27.81 kg/m².     SpO2: 99 %         Intake/Output Summary (Last 24 hours) at 1/25/2023 1353  Last data filed at 1/25/2023 0800  Gross per 24 hour   Intake 180 ml   Output --   Net 180 ml       Lines/Drains/Airways       None                   Physical Exam  Vitals and nursing note reviewed.   Constitutional:       Appearance: Normal appearance.   HENT:      Head: Normocephalic.   Eyes:      Pupils: Pupils are equal, round, and reactive to light.   Cardiovascular:      Rate and Rhythm: Normal rate and regular rhythm.      Heart sounds: Normal heart sounds, S1 normal and S2 normal. No murmur heard.    No S3 or S4 sounds.   Pulmonary:      Effort: Pulmonary effort is normal.      Breath sounds: Normal breath sounds.    Abdominal:      General: Bowel sounds are normal.      Palpations: Abdomen is soft.   Musculoskeletal:         General: Normal range of motion.      Cervical back: Normal range of motion.   Skin:     Capillary Refill: Capillary refill takes less than 2 seconds.   Neurological:      General: No focal deficit present.      Mental Status: She is alert and oriented to person, place, and time.   Psychiatric:         Mood and Affect: Mood normal.         Behavior: Behavior normal.         Thought Content: Thought content normal.       Significant Labs: BMP:   Recent Labs   Lab 01/23/23  1715 01/24/23  0507   GLU 95 89    143   K 3.9 3.9    108   CO2 23 24   BUN 9 8   CREATININE 1.2 1.0   CALCIUM 9.6 8.5*   MG 1.7 1.8   , CMP   Recent Labs   Lab 01/23/23  1715 01/24/23  0507    143   K 3.9 3.9    108   CO2 23 24   GLU 95 89   BUN 9 8   CREATININE 1.2 1.0   CALCIUM 9.6 8.5*   PROT 7.6 6.4   ALBUMIN 3.8 3.4*   BILITOT 0.5 0.4   ALKPHOS 67 58   AST 18 18   ALT 17 14   ANIONGAP 11 11   , CBC   Recent Labs   Lab 01/23/23  1715 01/24/23  0507   WBC 6.50 6.07   HGB 13.3 12.1   HCT 39.4 36.2*    256   , INR No results for input(s): INR, PROTIME in the last 48 hours., Lipid Panel No results for input(s): CHOL, HDL, LDLCALC, TRIG, CHOLHDL in the last 48 hours., Troponin   Recent Labs   Lab 01/23/23  1715 01/24/23  0246 01/24/23  0536   TROPONINI <0.006 0.008 <0.006   , and All pertinent lab results from the last 24 hours have been reviewed.    Significant Imaging: EKG: reviewed and X-Ray: CXR: X-Ray Chest 1 View (CXR): No results found for this visit on 01/23/23.    Assessment and Plan:       * Recurrent syncope  Denies palpitations and chest pain  Per patient she has been told she has irregular heart rhythm  Cont cardiac monitoring, hydration  Echo pending  Recommend OP holter monitor, follow up in cardiology clinic          VTE Risk Mitigation (From admission, onward)         Ordered     enoxaparin  injection 40 mg  Daily         01/23/23 1951                Lubna Hubbard, NP  Cardiology  [facility]

## 2023-01-25 NOTE — PLAN OF CARE
[facility]  Discharge Final Note    Primary Care Provider: YOJANA Hanson    Expected Discharge Date: 1/25/2023    Final Discharge Note (most recent)       Final Note - 01/25/23 1019          Final Note    Assessment Type Final Discharge Note     Anticipated Discharge Disposition Home or Self Care                     Important Message from Medicare             Contact Info       [facility]   Specialty: Cardiology    [id number] [place] [id number]   Phone: [phone]       Next Steps: Follow up in 1 week(s)    YOJANA Hanson   Specialty: Family Medicine   Relationship: PCP - General    [address]  [facility] [address]   Phone: [phone]       Next Steps: Go in 1 week(s)    Instructions: post hospital follow up          DC Dispo: home  PCP f/u: [place] [facility] [place] [facility]  Cardiology f/u: ambulatory referral placed and CM reached out to clinic staff regarding 2 week f/u.  CM also sent cardiology referral to [facility].

## 2023-01-25 NOTE — DISCHARGE SUMMARY
[facility] Medicine  Discharge Summary      Patient Name: Phillip Russell  MRN: [id number]  KEILA: [id number]  Patient Class: OP- Observation  Admission Date: 1/23/2023  Hospital Length of Stay: 0 days  Discharge Date and Time:  01/25/2023 12:37 PM  Attending Physician: No att. providers found   Discharging Provider: Hamida Varghese NP  Primary Care Provider: YOJANA Hanson    Primary Care Team: Networked reference to record PCT     HPI:   Ms. Russell is a 32-year-old  female with no significant medical problems, presented to the ED complaining of frequent syncopal episode for the past six months.  States that she feels lightheaded, dizzy with walking, associated with tingling in her arms and legs, unusually falls to the ground.  Sometimes loses consciousness.  Frequent a has been increasing of late.  Reports taking gabapentin, Robaxin for chronic neck pain.  Denies chest pain, palpitations.  Episodes of presyncope have been increasing significantly, almost every time she stands up.  UDS positive for PCP.  Denies alcohol use.  CT head negative for acute intracranial pathology.  Patient has no focal neurological deficits at this time.  Alert, oriented x3, unable to lift bilateral upper and lower extremities with no deficits.  Patient was seen in the ED in October of last year with unremarkable workup.  Laboratory workup today is unremarkable.  She will be placed in observation for MRI, MRA head and neck to rule out vertebrobasilar insufficiency.  Also monitored on telemetry for any possible cardiac arrhythmia as an etiology.  ED physician discussed case with on-call cardiologist, recommended echocardiogram in a.m. and IV fluids.    Placed in observation for recurrent syncope.      * No surgery found *      Hospital Course:   Phillip Russell is a 32-year-old female was placed in observation for recurrent syncope.  Workup including CT head, MRI I brain, and MRA  brain were negative for acute process.  Patient also admits to memory disturbance for which she was seen by Neurology who ordered Vitamin B levels and asked patient to wean gabapentin and muscle relaxants. She was seen by Cardiology who recommended outpatient Holter monitor-ambulatory order placed. She has not had further episodes and is stable for discharge. She was asked to follow up with PCP in one week.        Goals of Care Treatment Preferences:         Consults:   Consults (From admission, onward)        Status Ordering Provider     Inpatient consult to Social Work  Once        Provider:  (Not yet assigned)    Completed DOUG HAINES     Inpatient consult to Neurology  Once        Provider:  (Not yet assigned)    Completed DWAYNE HARO     Inpatient consult to Cardiology  Once        Provider:  Crow Munoz MD    Completed LUCIA SALDAÑA          No new Assessment & Plan notes have been filed under this hospital service since the last note was generated.  Service: Hospital Medicine    Final Active Diagnoses:    Diagnosis Date Noted POA    PRINCIPAL PROBLEM:  Recurrent syncope [R55] 01/23/2023 Yes      Problems Resolved During this Admission:       Discharged Condition: stable    Disposition: Home or Self Care    Follow Up:   Follow-up Information     [facility] Follow up in 1 week(s).    Specialty: Cardiology  Contact information:  [address] [facility] [address] [phone]  Additional information:  Please take [address] to the Medical Office Building. Check in on the 4th floor.           Barbie ANDERSON. Go on 2/1/2023.    Why: Feburary 1,2023 at 12:45pm.  Contact information:  [address] [facility] [place] [address] ·  [phone]                     Patient Instructions:      Ambulatory referral/consult to Cardiology   Standing Status: Future   Referral Priority: Routine Referral Type: Consultation   Referral Reason: Specialty Services Required    Requested Specialty: Cardiology   Number of Visits Requested: 1       Significant Diagnostic Studies: Labs:   CMP   Recent Labs   Lab 01/23/23  1715 01/24/23  0507    143   K 3.9 3.9    108   CO2 23 24   GLU 95 89   BUN 9 8   CREATININE 1.2 1.0   CALCIUM 9.6 8.5*   PROT 7.6 6.4   ALBUMIN 3.8 3.4*   BILITOT 0.5 0.4   ALKPHOS 67 58   AST 18 18   ALT 17 14   ANIONGAP 11 11    and CBC   Recent Labs   Lab 01/23/23  1715 01/24/23  0507   WBC 6.50 6.07   HGB 13.3 12.1   HCT 39.4 36.2*    256       Pending Diagnostic Studies:     Procedure Component Value Units Date/Time    Vitamin B1 [[id number]] Collected: 01/24/23 1651    Order Status: Sent Lab Status: In process Updated: 01/25/23 0311    Specimen: Blood     Vitamin E [[id number]] Collected: 01/24/23 1651    Order Status: Sent Lab Status: In process Updated: 01/25/23 0311    Specimen: Blood          Medications:  Reconciled Home Medications:      Medication List      CHANGE how you take these medications    gabapentin 600 MG tablet  Commonly known as: NEURONTIN  Take 0.5 tablets (300 mg total) by mouth 3 (three) times daily.  What changed: how much to take     methocarbamoL 500 MG Tab  Commonly known as: ROBAXIN  Take 1 tablet (500 mg total) by mouth 2 (two) times a day.  What changed: when to take this        CONTINUE taking these medications    amitriptyline 50 MG tablet  Commonly known as: ELAVIL  Take 50 mg by mouth every evening.     fluticasone propionate 50 mcg/actuation nasal spray  Commonly known as: FLONASE  2 sprays (100 mcg total) by Each Nostril route once daily.     ibuprofen 600 MG tablet  Commonly known as: ADVIL,MOTRIN  Take 1 tablet (600 mg total) by mouth every 6 (six) hours as needed for Pain.            Indwelling Lines/Drains at time of discharge:   Lines/Drains/Airways     None                 Time spent on the discharge of patient: >35 minutes         Hamida Varghese NP  Department of Hospital Medicine  [facility] [facility] S[facility]

## 2023-01-25 NOTE — PLAN OF CARE
[facility]  Discharge Assessment    Primary Care Provider: YOJANA Hanson     Discharge Assessment (most recent)       BRIEF DISCHARGE ASSESSMENT - 01/25/23 1018          Discharge Planning    Assessment Type Discharge Planning Reassessment     Resource/Environmental Concerns none     Support Systems Parent     Equipment Currently Used at Home none     Current Living Arrangements home     Patient/Family Anticipated Services at Transition none     DME Needed Upon Discharge  none     Discharge Plan A Home with family

## 2023-01-25 NOTE — HOSPITAL COURSE
Phillip Russell is a 32-year-old female was placed in observation for recurrent syncope.  Workup including CT head, MRI I brain, and MRA brain were negative for acute process.  Patient also admits to memory disturbance for which she was seen by Neurology who ordered Vitamin B levels and asked patient to wean gabapentin and muscle relaxants. She was seen by Cardiology who recommended outpatient Holter monitor-ambulatory order placed. She has not had further episodes and is stable for discharge. She was asked to follow up with PCP in one week.

## 2023-01-25 NOTE — PLAN OF CARE
Pt remains free from falls/injuries this shift. Safety precautions maintained. No s/s of acute distress noted. VSS. No c/o pain. Tele monitoring per orders. Continuing with plan of care. Chart check complete and all orders reviewed.

## 2023-01-25 NOTE — SUBJECTIVE & OBJECTIVE
Review of Systems   Constitutional: Negative.   HENT: Negative.     Eyes: Negative.    Cardiovascular: Negative.    Respiratory: Negative.     Skin: Negative.    Musculoskeletal: Negative.    Gastrointestinal: Negative.    Genitourinary: Negative.    Neurological: Negative.    Psychiatric/Behavioral: Negative.     Objective:     Vital Signs (Most Recent):  Temp: 98.2 °F (36.8 °C) (01/25/23 0736)  Pulse: 92 (01/25/23 0905)  Resp: 16 (01/25/23 0736)  BP: 118/63 (01/25/23 0736)  SpO2: 99 % (01/25/23 0736) Vital Signs (24h Range):  Temp:  [98 °F (36.7 °C)-98.3 °F (36.8 °C)] 98.2 °F (36.8 °C)  Pulse:  [] 92  Resp:  [16-18] 16  SpO2:  [98 %-100 %] 99 %  BP: (112-133)/(63-89) 118/63     Weight: 73.5 kg (162 lb 0.6 oz)  Body mass index is 27.81 kg/m².     SpO2: 99 %         Intake/Output Summary (Last 24 hours) at 1/25/2023 1353  Last data filed at 1/25/2023 0800  Gross per 24 hour   Intake 180 ml   Output --   Net 180 ml       Lines/Drains/Airways       None                   Physical Exam  Vitals and nursing note reviewed.   Constitutional:       Appearance: Normal appearance.   HENT:      Head: Normocephalic.   Eyes:      Pupils: Pupils are equal, round, and reactive to light.   Cardiovascular:      Rate and Rhythm: Normal rate and regular rhythm.      Heart sounds: Normal heart sounds, S1 normal and S2 normal. No murmur heard.    No S3 or S4 sounds.   Pulmonary:      Effort: Pulmonary effort is normal.      Breath sounds: Normal breath sounds.   Abdominal:      General: Bowel sounds are normal.      Palpations: Abdomen is soft.   Musculoskeletal:         General: Normal range of motion.      Cervical back: Normal range of motion.   Skin:     Capillary Refill: Capillary refill takes less than 2 seconds.   Neurological:      General: No focal deficit present.      Mental Status: She is alert and oriented to person, place, and time.   Psychiatric:         Mood and Affect: Mood normal.         Behavior: Behavior  normal.         Thought Content: Thought content normal.       Significant Labs: BMP:   Recent Labs   Lab 01/23/23  1715 01/24/23  0507   GLU 95 89    143   K 3.9 3.9    108   CO2 23 24   BUN 9 8   CREATININE 1.2 1.0   CALCIUM 9.6 8.5*   MG 1.7 1.8   , CMP   Recent Labs   Lab 01/23/23  1715 01/24/23  0507    143   K 3.9 3.9    108   CO2 23 24   GLU 95 89   BUN 9 8   CREATININE 1.2 1.0   CALCIUM 9.6 8.5*   PROT 7.6 6.4   ALBUMIN 3.8 3.4*   BILITOT 0.5 0.4   ALKPHOS 67 58   AST 18 18   ALT 17 14   ANIONGAP 11 11   , CBC   Recent Labs   Lab 01/23/23  1715 01/24/23  0507   WBC 6.50 6.07   HGB 13.3 12.1   HCT 39.4 36.2*    256   , INR No results for input(s): INR, PROTIME in the last 48 hours., Lipid Panel No results for input(s): CHOL, HDL, LDLCALC, TRIG, CHOLHDL in the last 48 hours., Troponin   Recent Labs   Lab 01/23/23  1715 01/24/23  0246 01/24/23  0536   TROPONINI <0.006 0.008 <0.006   , and All pertinent lab results from the last 24 hours have been reviewed.    Significant Imaging: EKG: reviewed and X-Ray: CXR: X-Ray Chest 1 View (CXR): No results found for this visit on 01/23/23.

## 2023-01-30 ENCOUNTER — TELEPHONE (OUTPATIENT)
Dept: CARDIOLOGY | Facility: CLINIC | Age: 33
End: 2023-01-30
Payer: MEDICAID

## 2023-01-30 LAB
A-TOCOPHEROL VIT E SERPL-MCNC: 1066 UG/DL (ref 500–1800)
VIT B1 BLD-MCNC: 43 UG/L (ref 38–122)

## 2023-01-30 NOTE — TELEPHONE ENCOUNTER
LVM for pt in regards to  Contact: Estrellitadomi  Gepepe needs a call back at [phone], Regards to getting an appointment schedule for today or Tuesday with Dr. Connor.    Thanks  Td

## 2023-02-01 ENCOUNTER — TELEPHONE (OUTPATIENT)
Dept: CARDIOLOGY | Facility: HOSPITAL | Age: 33
End: 2023-02-01
Payer: MEDICAID

## 2023-02-01 ENCOUNTER — OFFICE VISIT (OUTPATIENT)
Dept: CARDIOLOGY | Facility: CLINIC | Age: 33
End: 2023-02-01
Payer: MEDICAID

## 2023-02-01 VITALS
HEIGHT: 64 IN | DIASTOLIC BLOOD PRESSURE: 88 MMHG | HEART RATE: 60 BPM | BODY MASS INDEX: 24.39 KG/M2 | OXYGEN SATURATION: 98 % | WEIGHT: 142.88 LBS | SYSTOLIC BLOOD PRESSURE: 106 MMHG

## 2023-02-01 DIAGNOSIS — M54.2 CERVICAL PAIN: ICD-10-CM

## 2023-02-01 DIAGNOSIS — F33.1 MODERATE EPISODE OF RECURRENT MAJOR DEPRESSIVE DISORDER: ICD-10-CM

## 2023-02-01 DIAGNOSIS — F41.9 ANXIETY: ICD-10-CM

## 2023-02-01 DIAGNOSIS — R55 RECURRENT SYNCOPE: Primary | ICD-10-CM

## 2023-02-01 DIAGNOSIS — K21.9 GASTROESOPHAGEAL REFLUX DISEASE WITHOUT ESOPHAGITIS: ICD-10-CM

## 2023-02-01 DIAGNOSIS — G43.809 OTHER MIGRAINE WITHOUT STATUS MIGRAINOSUS, NOT INTRACTABLE: ICD-10-CM

## 2023-02-01 PROBLEM — G43.909 MIGRAINES: Status: ACTIVE | Noted: 2023-02-01

## 2023-02-01 PROCEDURE — 3074F SYST BP LT 130 MM HG: CPT | Mod: CPTII,,, | Performed by: INTERNAL MEDICINE

## 2023-02-01 PROCEDURE — 3008F PR BODY MASS INDEX (BMI) DOCUMENTED: ICD-10-PCS | Mod: CPTII,,, | Performed by: INTERNAL MEDICINE

## 2023-02-01 PROCEDURE — 99214 OFFICE O/P EST MOD 30 MIN: CPT | Mod: PBBFAC | Performed by: INTERNAL MEDICINE

## 2023-02-01 PROCEDURE — 99214 PR OFFICE/OUTPT VISIT, EST, LEVL IV, 30-39 MIN: ICD-10-PCS | Mod: S$PBB,,, | Performed by: INTERNAL MEDICINE

## 2023-02-01 PROCEDURE — 3008F BODY MASS INDEX DOCD: CPT | Mod: CPTII,,, | Performed by: INTERNAL MEDICINE

## 2023-02-01 PROCEDURE — 1159F MED LIST DOCD IN RCRD: CPT | Mod: CPTII,,, | Performed by: INTERNAL MEDICINE

## 2023-02-01 PROCEDURE — 3079F PR MOST RECENT DIASTOLIC BLOOD PRESSURE 80-89 MM HG: ICD-10-PCS | Mod: CPTII,,, | Performed by: INTERNAL MEDICINE

## 2023-02-01 PROCEDURE — 3074F PR MOST RECENT SYSTOLIC BLOOD PRESSURE < 130 MM HG: ICD-10-PCS | Mod: CPTII,,, | Performed by: INTERNAL MEDICINE

## 2023-02-01 PROCEDURE — 1160F PR REVIEW ALL MEDS BY PRESCRIBER/CLIN PHARMACIST DOCUMENTED: ICD-10-PCS | Mod: CPTII,,, | Performed by: INTERNAL MEDICINE

## 2023-02-01 PROCEDURE — 1160F RVW MEDS BY RX/DR IN RCRD: CPT | Mod: CPTII,,, | Performed by: INTERNAL MEDICINE

## 2023-02-01 PROCEDURE — 99999 PR PBB SHADOW E&M-EST. PATIENT-LVL IV: ICD-10-PCS | Mod: PBBFAC,,, | Performed by: INTERNAL MEDICINE

## 2023-02-01 PROCEDURE — 99214 OFFICE O/P EST MOD 30 MIN: CPT | Mod: S$PBB,,, | Performed by: INTERNAL MEDICINE

## 2023-02-01 PROCEDURE — 99999 PR PBB SHADOW E&M-EST. PATIENT-LVL IV: CPT | Mod: PBBFAC,,, | Performed by: INTERNAL MEDICINE

## 2023-02-01 PROCEDURE — 1159F PR MEDICATION LIST DOCUMENTED IN MEDICAL RECORD: ICD-10-PCS | Mod: CPTII,,, | Performed by: INTERNAL MEDICINE

## 2023-02-01 PROCEDURE — 3079F DIAST BP 80-89 MM HG: CPT | Mod: CPTII,,, | Performed by: INTERNAL MEDICINE

## 2023-02-01 RX ORDER — HYDROXYZINE PAMOATE 100 MG/1
CAPSULE ORAL
COMMUNITY
Start: 2023-01-26

## 2023-02-01 NOTE — PROGRESS NOTES
Subjective:   Patient ID:  Phillip Russell is a 32 y.o. female who presents for cardiac consult of No chief complaint on file.      HPI  The patient came in today for cardiac consult of No chief complaint on file.    Phillip Russell is a 32 y.o. female pt with recurrent syncope, migraines, depression, anxiety, cervical pain, GERD presents for CV follow up.     Jan 2021 HPI:   Ms. Russell is a 32-year-old  female with no significant medical problems, presented to the ED complaining of frequent syncopal episode for the past six months.  States that she feels lightheaded, dizzy with walking, associated with tingling in her arms and legs, unusually falls to the ground.  Sometimes loses consciousness.  Frequent a has been increasing of late.  Reports taking gabapentin, Robaxin for chronic neck pain.  Denies chest pain, palpitations.  Episodes of presyncope have been increasing significantly, almost every time she stands up.  UDS positive for PCP.  Denies alcohol use.  CT head negative for acute intracranial pathology.  Patient has no focal neurological deficits at this time.  Alert, oriented x3, unable to lift bilateral upper and lower extremities with no deficits.  Patient was seen in the ED in October of last year with unremarkable workup.  Laboratory workup today is unremarkable.  She will be placed in observation for MRI, MRA head and neck to rule out vertebrobasilar insufficiency.  Also monitored on telemetry for any possible cardiac arrhythmia as an etiology.  ED physician discussed case with on-call cardiologist, recommended echocardiogram in a.m. and IV fluids.     Placed in observation for recurrent syncope.     Hospital Course:   Phillip Russell is a 32-year-old female was placed in observation for recurrent syncope.  Workup including CT head, MRI I brain, and MRA brain were negative for acute process.  Patient also admits to memory disturbance for which she was seen by  Neurology who ordered Vitamin B levels and asked patient to wean gabapentin and muscle relaxants. She was seen by Cardiology who recommended outpatient Holter monitor-ambulatory order placed. She has not had further episodes and is stable for discharge. She was asked to follow up with PCP in one week.     2/1/23  Follow up from [facility]. ECHO 1/2023 with normal bi V function. Telemetry monitoring neg. BP and HR low this AM, she has more tremors now.     Patient feels  no chest pain, no sob, no leg swelling, no PND.    Patient has fairly good exercise tolerance.    Patient is compliant with medications.    Results for orders placed during the hospital encounter of 01/23/23    Echo    Interpretation Summary  · The left ventricle is normal in size with concentric remodeling and normal systolic function.  · The estimated ejection fraction is 60%.  · Normal left ventricular diastolic function.  · Normal right ventricular size with normal right ventricular systolic function.  · Mild tricuspid regurgitation.  · Normal central venous pressure (3 mmHg).  · The estimated PA systolic pressure is 30 mmHg.      Past Medical History:   Diagnosis Date    Bulging of cervical intervertebral disc     Herniated cervical disc     Miscarriage     Neck pain, chronic        Past Surgical History:   Procedure Laterality Date    DILATION AND CURETTAGE OF UTERUS      HEMORRHOID SURGERY         Social History     Tobacco Use    Smoking status: Never    Smokeless tobacco: Never   Substance Use Topics    Alcohol use: No    Drug use: No       History reviewed. No pertinent family history.    Patient's Medications   New Prescriptions    No medications on file   Previous Medications    AMITRIPTYLINE (ELAVIL) 50 MG TABLET    Take 50 mg by mouth every evening.    FLUTICASONE PROPIONATE (FLONASE) 50 MCG/ACTUATION NASAL SPRAY    2 sprays (100 mcg total) by Each Nostril route once daily.    GABAPENTIN (NEURONTIN) 600 MG TABLET    Take 0.5 tablets (300  "mg total) by mouth 3 (three) times daily.    HYDROXYZINE (VISTARIL) 100 MG CAPSULE    TAKE 1 CAPSULE BY MOUTH THREE TIMES DAILY AS NEEDED FOR ANXIETY    IBUPROFEN (ADVIL,MOTRIN) 600 MG TABLET    Take 1 tablet (600 mg total) by mouth every 6 (six) hours as needed for Pain.    METHOCARBAMOL (ROBAXIN) 500 MG TAB    Take 1 tablet (500 mg total) by mouth 2 (two) times a day.   Modified Medications    No medications on file   Discontinued Medications    No medications on file       Review of Systems   Constitutional: Negative.    HENT: Negative.     Eyes: Negative.    Respiratory: Negative.     Cardiovascular: Negative.    Gastrointestinal: Negative.    Genitourinary: Negative.    Musculoskeletal: Negative.    Skin: Negative.    Neurological:  Positive for dizziness and loss of consciousness.   Endo/Heme/Allergies: Negative.    Psychiatric/Behavioral: Negative.     All 12 systems otherwise negative.    Wt Readings from Last 3 Encounters:   02/01/23 64.8 kg (142 lb 13.7 oz)   01/25/23 73.5 kg (162 lb 0.6 oz)   10/05/22 63 kg (139 lb)     Temp Readings from Last 3 Encounters:   01/25/23 98.2 °F (36.8 °C) (Oral)   10/05/22 98.1 °F (36.7 °C) (Oral)   03/23/22 98.9 °F (37.2 °C) (Tympanic)     BP Readings from Last 3 Encounters:   02/01/23 106/88   01/25/23 118/63   10/06/22 (!) 112/57     Pulse Readings from Last 3 Encounters:   02/01/23 60   01/25/23 92   10/06/22 78       /88 (BP Location: Left arm, Patient Position: Sitting, BP Method: Large (Manual))   Pulse 60   Ht 5' 4" (1.626 m)   Wt 64.8 kg (142 lb 13.7 oz)   LMP  (LMP Unknown)   SpO2 98%   BMI 24.52 kg/m²     Objective:   Physical Exam  Vitals and nursing note reviewed.   Constitutional:       General: She is not in acute distress.     Appearance: She is well-developed. She is not diaphoretic.   HENT:      Head: Normocephalic and atraumatic.      Nose: Nose normal.   Eyes:      General: No scleral icterus.     Conjunctiva/sclera: Conjunctivae normal. "   Neck:      Thyroid: No thyromegaly.      Vascular: No JVD.   Cardiovascular:      Rate and Rhythm: Normal rate and regular rhythm.      Heart sounds: S1 normal and S2 normal. No murmur heard.    No friction rub. No gallop. No S3 or S4 sounds.   Pulmonary:      Effort: Pulmonary effort is normal. No respiratory distress.      Breath sounds: Normal breath sounds. No stridor. No wheezing or rales.   Chest:      Chest wall: No tenderness.   Abdominal:      General: Bowel sounds are normal. There is no distension.      Palpations: Abdomen is soft. There is no mass.      Tenderness: There is no abdominal tenderness. There is no rebound.   Genitourinary:     Comments: Deferred  Musculoskeletal:         General: No tenderness or deformity. Normal range of motion.      Cervical back: Normal range of motion and neck supple.   Lymphadenopathy:      Cervical: No cervical adenopathy.   Skin:     General: Skin is warm and dry.      Coloration: Skin is not pale.      Findings: No erythema or rash.   Neurological:      Mental Status: She is alert and oriented to person, place, and time.      Motor: No abnormal muscle tone.      Coordination: Coordination normal.   Psychiatric:         Behavior: Behavior normal.         Thought Content: Thought content normal.         Judgment: Judgment normal.       Lab Results   Component Value Date     01/24/2023    K 3.9 01/24/2023     01/24/2023    CO2 24 01/24/2023    BUN 8 01/24/2023    CREATININE 1.0 01/24/2023    GLU 89 01/24/2023    HGBA1C 5.3 10/11/2022    MG 1.8 01/24/2023    AST 18 01/24/2023    ALT 14 01/24/2023    ALBUMIN 3.4 (L) 01/24/2023    PROT 6.4 01/24/2023    BILITOT 0.4 01/24/2023    WBC 6.07 01/24/2023    HGB 12.1 01/24/2023    HCT 36.2 (L) 01/24/2023    MCV 79 (L) 01/24/2023     01/24/2023    TSH 1.350 01/23/2023     Assessment:      1. Recurrent syncope    2. Other migraine without status migrainosus, not intractable    3. Moderate episode of recurrent  major depressive disorder    4. Gastroesophageal reflux disease without esophagitis    5. Cervical pain    6. Anxiety        Plan:       Recurrent syncope - s/p Jan 2023 hosp eval  Denies palpitations and chest pain  Per patient she has been told she has irregular heart rhythm  Echo normal  Will OP holter monitor, refer to EP, rec Tilt Table testing  - refer to neuro, decreased pain tx     2. Migraines  - cont tx per PCP/neuro    3. Anxiety/depression  - cont tx per PCP/psych    4. Cervical pain  - cont pain tx    5. GERD  - rec PPI.    Thank you for allowing me to participate in this patient's care. Please do not hesitate to contact me with any questions or concerns. Consult note has been forwarded to the referral physician.

## 2023-02-02 ENCOUNTER — TELEPHONE (OUTPATIENT)
Dept: CARDIOLOGY | Facility: CLINIC | Age: 33
End: 2023-02-02
Payer: MEDICAID

## 2023-02-02 NOTE — TELEPHONE ENCOUNTER
LVM for pt in regards to   Pt is requesting a call back concerning the tilt table test. She stated she wants a sooner appt. Call back number [phone]  [facility]

## 2023-02-02 NOTE — TELEPHONE ENCOUNTER
Wanted to make sure June was earliest for TILT table  -- pt is on waitlist as well    ----- Message from Diana Mcleod sent at 2/2/2023  8:43 AM CST -----  Contact: pt  Type:  Patient Returning Call    Who Called:pt  Who Left Message for Patient:nurse   Does the patient know what this is regarding?: tilt table  Would the patient rather a call back or a response via [facility]? phone  Best Call Back Number: [phone]  Additional Information:

## 2023-02-08 ENCOUNTER — HOSPITAL ENCOUNTER (OUTPATIENT)
Dept: CARDIOLOGY | Facility: HOSPITAL | Age: 33
Discharge: HOME | End: 2023-02-08
Attending: INTERNAL MEDICINE
Payer: MEDICAID

## 2023-02-08 DIAGNOSIS — R55 RECURRENT SYNCOPE: ICD-10-CM

## 2023-02-08 PROCEDURE — 93227 XTRNL ECG REC<48 HR R&I: CPT | Mod: ,,, | Performed by: INTERNAL MEDICINE

## 2023-02-08 PROCEDURE — 93227 HOLTER MONITOR - 48 HOUR (CUPID ONLY): ICD-10-PCS | Mod: ,,, | Performed by: INTERNAL MEDICINE

## 2023-02-08 PROCEDURE — 93225 XTRNL ECG REC<48 HRS REC: CPT

## 2023-02-14 LAB
OHS CV EVENT MONITOR DAY: 0
OHS CV HOLTER LENGTH DECIMAL HOURS: 48
OHS CV HOLTER LENGTH HOURS: 48
OHS CV HOLTER LENGTH MINUTES: 0
OHS CV HOLTER SINUS AVERAGE HR: 99
OHS CV HOLTER SINUS MAX HR: 141
OHS CV HOLTER SINUS MIN HR: 69

## 2023-02-15 ENCOUNTER — TELEPHONE (OUTPATIENT)
Dept: CARDIOLOGY | Facility: CLINIC | Age: 33
End: 2023-02-15
Payer: MEDICAID

## 2023-02-15 NOTE — TELEPHONE ENCOUNTER
LVM for pt in regards to      Please contact the patient and let them know that their results of Holter reveal overall mildly elevated heart rates but without any arrhythmias or frequent extra beats, follow up with Dr. Kamara - rhythm cardiologist as scheduled and then Tilt table.

## 2023-04-11 ENCOUNTER — TELEPHONE (OUTPATIENT)
Dept: NEUROLOGY | Facility: CLINIC | Age: 33
End: 2023-04-11
Payer: MEDICAID

## 2023-04-11 ENCOUNTER — TELEPHONE (OUTPATIENT)
Dept: CARDIOLOGY | Facility: CLINIC | Age: 33
End: 2023-04-11
Payer: MEDICAID

## 2023-04-11 NOTE — TELEPHONE ENCOUNTER
----- Message from Scot Mart sent at 4/11/2023 12:12 PM CDT -----  Contact: Patient  Type:  Patient Call          Who Called: Patient         Does the patient know what this is regarding?: Requesting a call back to have a apt scheduled ;please advise           Would the patient rather a call back or a response via [facility]?call           Best Call Back Number:[phone]             Additional Information:

## 2023-04-11 NOTE — TELEPHONE ENCOUNTER
----- Message from Berta Doss sent at 4/11/2023  3:01 PM CDT -----  Contact: Phillip  .Type:  Patient Returning Call    Who Called:Phillip  Who Left Message for Patient:nurse  Does the patient know what this is regarding?:yes  Would the patient rather a call back or a response via [facility] [employer]? Call back  Best Call Back Number:[phone]  Additional Information: na          Thanks  PRAKASH

## 2023-04-11 NOTE — TELEPHONE ENCOUNTER
Tried calling pt back in regards to     Type:  Patient Call          Who Called: patient         Does the patient know what this is regarding?: Requesting a call back from a missed call ;please advise           Would the patient rather a call back or a response via [facility]?both           Best Call Back Number:[phone]             Additional Information:

## 2023-04-11 NOTE — TELEPHONE ENCOUNTER
----- Message from Mirela Moseley MA sent at 4/11/2023  3:50 PM CDT -----  Contact: Phillip Win pt requesting a call back to get appt scheduled with neuro  ----- Message -----  From: Berta Doss  Sent: 4/11/2023   3:03 PM CDT  To: [facility] Mccauley Staff    .Type:  Patient Returning Call    Who Called:Phillip  Who Left Message for Patient:nurse  Does the patient know what this is regarding?:yes  Would the patient rather a call back or a response via [facility]? Call back  Best Call Back Number:[phone]  Additional Information: na          Thanks  PRAKASH

## 2023-04-11 NOTE — TELEPHONE ENCOUNTER
Spoke with patient and advised her of our current scheduling access. Advised her of our community care plan for medicaid patients. Patient stated she would reach out to them in regards to scheduling appt information.

## 2023-05-02 ENCOUNTER — PATIENT MESSAGE (OUTPATIENT)
Dept: CARDIOLOGY | Facility: CLINIC | Age: 33
End: 2023-05-02
Payer: MEDICAID

## 2023-05-02 DIAGNOSIS — R55 RECURRENT SYNCOPE: Primary | ICD-10-CM

## 2023-05-02 NOTE — PROGRESS NOTES
Subjective:   Patient ID:  Phillip Russell is a 32 y.o. female who presents for evaluation of Loss of Consciousness      32 yoF referred for abnormal holter monitor and syncope. She was admitted for syncope 2/23. CT head, MRI I brain, and MRA brain were negative for acute process. She had no event on telemetry. Holter with average HR 99 bpm- no arrhythmia detected. EF normal. She takes gabapentin and robaxin for musculoskeletal pain. She feels rapid heart rates before the events. She gets warmth and flushing.     Echo 1/23:  · The left ventricle is normal in size with concentric remodeling and normal systolic function.  · The estimated ejection fraction is 60%.  · Normal left ventricular diastolic function.  · Normal right ventricular size with normal right ventricular systolic function.  · Mild tricuspid regurgitation.  · Normal central venous pressure (3 mmHg).  · The estimated PA systolic pressure is 30 mmHg.    Past Medical History:  No date: Bulging of cervical intervertebral disc  No date: Herniated cervical disc  No date: Miscarriage  No date: Neck pain, chronic    Past Surgical History:  No date: DILATION AND CURETTAGE OF UTERUS  No date: HEMORRHOID SURGERY    Social History    Socioeconomic History      Marital status: Single    Tobacco Use      Smoking status: Never      Smokeless tobacco: Never    Substance and Sexual Activity      Alcohol use: No      Drug use: No    No family history on file.    Current Outpatient Medications:  amitriptyline (ELAVIL) 50 MG tablet, Take 50 mg by mouth every evening., Disp: , Rfl:   fluticasone propionate (FLONASE) 50 mcg/actuation nasal spray, 2 sprays (100 mcg total) by Each Nostril route once daily., Disp: 16 g, Rfl: 0  gabapentin (NEURONTIN) 600 MG tablet, Take 0.5 tablets (300 mg total) by mouth 3 (three) times daily., Disp: , Rfl:   hydrOXYzine (VISTARIL) 100 MG capsule, TAKE 1 CAPSULE BY MOUTH THREE TIMES DAILY AS NEEDED FOR ANXIETY, Disp: , Rfl:    methocarbamoL (ROBAXIN) 500 MG Tab, Take 1 tablet (500 mg total) by mouth 2 (two) times a day., Disp: , Rfl:     No current facility-administered medications for this visit.        Review of Systems   Constitutional: Negative.   HENT: Negative.     Eyes: Negative.    Cardiovascular:  Positive for near-syncope and syncope. Negative for chest pain, dyspnea on exertion, leg swelling and palpitations.   Respiratory: Negative.  Negative for shortness of breath.    Endocrine: Negative.    Hematologic/Lymphatic: Negative.    Skin: Negative.    Musculoskeletal: Negative.    Gastrointestinal: Negative.    Genitourinary: Negative.    Neurological:  Negative for dizziness and light-headedness.   Psychiatric/Behavioral: Negative.     Allergic/Immunologic: Negative.      Objective:   Physical Exam  Vitals reviewed.   Constitutional:       General: She is not in acute distress.     Appearance: She is well-developed.   HENT:      Head: Normocephalic and atraumatic.   Eyes:      Pupils: Pupils are equal, round, and reactive to light.   Neck:      Thyroid: No thyromegaly.      Vascular: No JVD.   Cardiovascular:      Rate and Rhythm: Normal rate and regular rhythm.      Chest Wall: PMI is not displaced.      Heart sounds: Normal heart sounds, S1 normal and S2 normal. No murmur heard.    No friction rub. No gallop.   Pulmonary:      Effort: Pulmonary effort is normal. No respiratory distress.      Breath sounds: Normal breath sounds. No wheezing or rales.   Abdominal:      General: Bowel sounds are normal. There is no distension.      Palpations: Abdomen is soft.      Tenderness: There is no abdominal tenderness. There is no guarding or rebound.   Musculoskeletal:         General: No tenderness. Normal range of motion.      Cervical back: Normal range of motion.   Skin:     General: Skin is warm and dry.      Findings: No erythema or rash.   Neurological:      Mental Status: She is alert and oriented to person, place, and time.       Cranial Nerves: No cranial nerve deficit.   Psychiatric:         Behavior: Behavior normal.         Thought Content: Thought content normal.         Judgment: Judgment normal.     ECG: NSR nl WV, QRS, QTc    Assessment:      1. Recurrent syncope        Plan:   32 yoF here for syncope. She has no arrhythmic etiology for her syncope to date. She takes medications that would lead to hypotension and syncope. She is due for a tilt table test. If this is not diagnostic, I would offer ILR. Will try metoprolol 25 mg qd RTC 3m

## 2023-05-03 ENCOUNTER — OFFICE VISIT (OUTPATIENT)
Dept: CARDIOLOGY | Facility: CLINIC | Age: 33
End: 2023-05-03
Payer: MEDICAID

## 2023-05-03 ENCOUNTER — HOSPITAL ENCOUNTER (OUTPATIENT)
Dept: CARDIOLOGY | Facility: HOSPITAL | Age: 33
Discharge: HOME | End: 2023-05-03
Attending: INTERNAL MEDICINE
Payer: MEDICAID

## 2023-05-03 VITALS
SYSTOLIC BLOOD PRESSURE: 118 MMHG | OXYGEN SATURATION: 97 % | WEIGHT: 151.88 LBS | DIASTOLIC BLOOD PRESSURE: 90 MMHG | HEART RATE: 102 BPM | BODY MASS INDEX: 26.07 KG/M2

## 2023-05-03 DIAGNOSIS — R55 RECURRENT SYNCOPE: ICD-10-CM

## 2023-05-03 DIAGNOSIS — R55 RECURRENT SYNCOPE: Primary | ICD-10-CM

## 2023-05-03 PROCEDURE — 3008F PR BODY MASS INDEX (BMI) DOCUMENTED: ICD-10-PCS | Mod: CPTII,,, | Performed by: INTERNAL MEDICINE

## 2023-05-03 PROCEDURE — 1160F PR REVIEW ALL MEDS BY PRESCRIBER/CLIN PHARMACIST DOCUMENTED: ICD-10-PCS | Mod: CPTII,,, | Performed by: INTERNAL MEDICINE

## 2023-05-03 PROCEDURE — 1159F MED LIST DOCD IN RCRD: CPT | Mod: CPTII,,, | Performed by: INTERNAL MEDICINE

## 2023-05-03 PROCEDURE — 1159F PR MEDICATION LIST DOCUMENTED IN MEDICAL RECORD: ICD-10-PCS | Mod: CPTII,,, | Performed by: INTERNAL MEDICINE

## 2023-05-03 PROCEDURE — 3074F PR MOST RECENT SYSTOLIC BLOOD PRESSURE < 130 MM HG: ICD-10-PCS | Mod: CPTII,,, | Performed by: INTERNAL MEDICINE

## 2023-05-03 PROCEDURE — 99999 PR PBB SHADOW E&M-EST. PATIENT-LVL III: ICD-10-PCS | Mod: PBBFAC,,, | Performed by: INTERNAL MEDICINE

## 2023-05-03 PROCEDURE — 99214 PR OFFICE/OUTPT VISIT, EST, LEVL IV, 30-39 MIN: ICD-10-PCS | Mod: S$PBB,,, | Performed by: INTERNAL MEDICINE

## 2023-05-03 PROCEDURE — 99214 OFFICE O/P EST MOD 30 MIN: CPT | Mod: S$PBB,,, | Performed by: INTERNAL MEDICINE

## 2023-05-03 PROCEDURE — 3074F SYST BP LT 130 MM HG: CPT | Mod: CPTII,,, | Performed by: INTERNAL MEDICINE

## 2023-05-03 PROCEDURE — 99999 PR PBB SHADOW E&M-EST. PATIENT-LVL III: CPT | Mod: PBBFAC,,, | Performed by: INTERNAL MEDICINE

## 2023-05-03 PROCEDURE — 93010 ELECTROCARDIOGRAM REPORT: CPT | Mod: ,,, | Performed by: STUDENT IN AN ORGANIZED HEALTH CARE EDUCATION/TRAINING PROGRAM

## 2023-05-03 PROCEDURE — 3008F BODY MASS INDEX DOCD: CPT | Mod: CPTII,,, | Performed by: INTERNAL MEDICINE

## 2023-05-03 PROCEDURE — 93005 ELECTROCARDIOGRAM TRACING: CPT

## 2023-05-03 PROCEDURE — 3080F PR MOST RECENT DIASTOLIC BLOOD PRESSURE >= 90 MM HG: ICD-10-PCS | Mod: CPTII,,, | Performed by: INTERNAL MEDICINE

## 2023-05-03 PROCEDURE — 99213 OFFICE O/P EST LOW 20 MIN: CPT | Mod: PBBFAC | Performed by: INTERNAL MEDICINE

## 2023-05-03 PROCEDURE — 93010 EKG 12-LEAD: ICD-10-PCS | Mod: ,,, | Performed by: STUDENT IN AN ORGANIZED HEALTH CARE EDUCATION/TRAINING PROGRAM

## 2023-05-03 PROCEDURE — 1160F RVW MEDS BY RX/DR IN RCRD: CPT | Mod: CPTII,,, | Performed by: INTERNAL MEDICINE

## 2023-05-03 PROCEDURE — 3080F DIAST BP >= 90 MM HG: CPT | Mod: CPTII,,, | Performed by: INTERNAL MEDICINE

## 2023-05-03 RX ORDER — METOPROLOL SUCCINATE 25 MG/1
25 TABLET, EXTENDED RELEASE ORAL DAILY
Qty: 30 TABLET | Refills: 11 | Status: SHIPPED | OUTPATIENT
Start: 2023-05-03 | End: 2023-08-29 | Stop reason: SDUPTHER

## 2023-05-05 ENCOUNTER — TELEPHONE (OUTPATIENT)
Dept: CARDIOLOGY | Facility: CLINIC | Age: 33
End: 2023-05-05
Payer: MEDICAID

## 2023-05-05 NOTE — TELEPHONE ENCOUNTER
Metoprolol was called in to her pharmacy on 05- left a message on rec stating that pb      ----- Message from Mary Hatch MA sent at 5/3/2023  3:45 PM CDT -----  Contact: Phillip    ----- Message -----  From: Gale Snider  Sent: 5/3/2023   3:32 PM CDT  To: Asad Melo Staff    Patient is calling to speak with someone regarding medication. Patient reports new prescription has been ordered and request to be informed of status. Please give patient a call back at [phone] when possible.  Thank you,  GH

## 2023-05-10 ENCOUNTER — TELEPHONE (OUTPATIENT)
Dept: CARDIOLOGY | Facility: HOSPITAL | Age: 33
End: 2023-05-10
Payer: MEDICAID

## 2023-05-11 ENCOUNTER — PATIENT MESSAGE (OUTPATIENT)
Dept: CARDIOLOGY | Facility: HOSPITAL | Age: 33
End: 2023-05-11
Payer: MEDICAID

## 2023-05-11 DIAGNOSIS — R55 SYNCOPE AND COLLAPSE: Primary | ICD-10-CM

## 2023-05-15 ENCOUNTER — TELEPHONE (OUTPATIENT)
Dept: CARDIOLOGY | Facility: HOSPITAL | Age: 33
End: 2023-05-15
Payer: MEDICAID

## 2023-05-22 ENCOUNTER — HOSPITAL ENCOUNTER (OUTPATIENT)
Dept: CARDIOLOGY | Facility: HOSPITAL | Age: 33
Discharge: HOME | End: 2023-05-22
Attending: INTERNAL MEDICINE
Payer: MEDICAID

## 2023-05-22 ENCOUNTER — DOCUMENTATION ONLY (OUTPATIENT)
Dept: CARDIOLOGY | Facility: HOSPITAL | Age: 33
End: 2023-05-22
Payer: MEDICAID

## 2023-05-22 DIAGNOSIS — R55 RECURRENT SYNCOPE: Primary | ICD-10-CM

## 2023-05-22 PROCEDURE — 93660 TILT TABLE EVALUATION: CPT

## 2023-05-22 PROCEDURE — 83835 ASSAY OF METANEPHRINES: CPT | Mod: 91 | Performed by: INTERNAL MEDICINE

## 2023-05-22 PROCEDURE — 36415 COLL VENOUS BLD VENIPUNCTURE: CPT | Performed by: INTERNAL MEDICINE

## 2023-05-22 PROCEDURE — 93660 TILT TABLE EVALUATION: CPT | Mod: 26,,, | Performed by: INTERNAL MEDICINE

## 2023-05-22 PROCEDURE — 84244 ASSAY OF RENIN: CPT | Performed by: INTERNAL MEDICINE

## 2023-05-22 PROCEDURE — 82088 ASSAY OF ALDOSTERONE: CPT | Mod: 91 | Performed by: INTERNAL MEDICINE

## 2023-05-22 PROCEDURE — 82384 ASSAY THREE CATECHOLAMINES: CPT | Mod: 91 | Performed by: INTERNAL MEDICINE

## 2023-05-22 PROCEDURE — 93660 TILT TABLE (CUPID ONLY): ICD-10-PCS | Mod: 26,,, | Performed by: INTERNAL MEDICINE

## 2023-05-22 NOTE — PROGRESS NOTES
Pt tolerated tilt testing. Results sent to MD for review. Pt DC'd home with no complaints or questions. VSS

## 2023-05-25 LAB
ALDOST SERPL-MCNC: 4 NG/DL
ALDOST SERPL-MCNC: 5 NG/DL
RENIN PLAS-CCNC: <0.6 NG/ML/H
RENIN PLAS-CCNC: <0.6 NG/ML/H

## 2023-06-02 LAB
METANEPH FREE SERPL-MCNC: 32 PG/ML
METANEPH FREE SERPL-MCNC: <25 PG/ML
METANEPHS SERPL-MCNC: 66 PG/ML
METANEPHS SERPL-MCNC: 96 PG/ML
NORMETANEPH FREE SERPL-MCNC: 64 PG/ML
NORMETANEPH FREE SERPL-MCNC: 66 PG/ML

## 2023-06-05 ENCOUNTER — HOSPITAL ENCOUNTER (EMERGENCY)
Facility: HOSPITAL | Age: 33
Discharge: HOME | End: 2023-06-05
Attending: EMERGENCY MEDICINE
Payer: MEDICAID

## 2023-06-05 VITALS
TEMPERATURE: 98 F | DIASTOLIC BLOOD PRESSURE: 87 MMHG | HEIGHT: 64 IN | WEIGHT: 150 LBS | HEART RATE: 88 BPM | OXYGEN SATURATION: 98 % | BODY MASS INDEX: 25.61 KG/M2 | SYSTOLIC BLOOD PRESSURE: 122 MMHG | RESPIRATION RATE: 15 BRPM

## 2023-06-05 DIAGNOSIS — R55 SYNCOPE: ICD-10-CM

## 2023-06-05 DIAGNOSIS — R11.2 NAUSEA AND VOMITING, UNSPECIFIED VOMITING TYPE: Primary | ICD-10-CM

## 2023-06-05 DIAGNOSIS — R55 NEAR SYNCOPE: ICD-10-CM

## 2023-06-05 LAB
ALBUMIN SERPL BCP-MCNC: 4.2 G/DL (ref 3.5–5.2)
ALP SERPL-CCNC: 81 U/L (ref 55–135)
ALT SERPL W/O P-5'-P-CCNC: 10 U/L (ref 10–44)
ANION GAP SERPL CALC-SCNC: 11 MMOL/L (ref 8–16)
AST SERPL-CCNC: 18 U/L (ref 10–40)
B-HCG UR QL: NEGATIVE
BACTERIA #/AREA URNS HPF: ABNORMAL /HPF
BASOPHILS # BLD AUTO: 0.03 K/UL (ref 0–0.2)
BASOPHILS NFR BLD: 0.5 % (ref 0–1.9)
BILIRUB SERPL-MCNC: 0.7 MG/DL (ref 0.1–1)
BILIRUB UR QL STRIP: NEGATIVE
BUN SERPL-MCNC: 10 MG/DL (ref 6–20)
CALCIUM SERPL-MCNC: 9.8 MG/DL (ref 8.7–10.5)
CHLORIDE SERPL-SCNC: 104 MMOL/L (ref 95–110)
CLARITY UR: ABNORMAL
CO2 SERPL-SCNC: 22 MMOL/L (ref 23–29)
COLOR UR: YELLOW
CREAT SERPL-MCNC: 1.1 MG/DL (ref 0.5–1.4)
DIFFERENTIAL METHOD: ABNORMAL
EOSINOPHIL # BLD AUTO: 0.1 K/UL (ref 0–0.5)
EOSINOPHIL NFR BLD: 1.2 % (ref 0–8)
ERYTHROCYTE [DISTWIDTH] IN BLOOD BY AUTOMATED COUNT: 14.6 % (ref 11.5–14.5)
EST. GFR  (NO RACE VARIABLE): >60 ML/MIN/1.73 M^2
GLUCOSE SERPL-MCNC: 77 MG/DL (ref 70–110)
GLUCOSE UR QL STRIP: NEGATIVE
HCT VFR BLD AUTO: 38.6 % (ref 37–48.5)
HGB BLD-MCNC: 12.8 G/DL (ref 12–16)
HGB UR QL STRIP: ABNORMAL
IMM GRANULOCYTES # BLD AUTO: 0.01 K/UL (ref 0–0.04)
IMM GRANULOCYTES NFR BLD AUTO: 0.2 % (ref 0–0.5)
KETONES UR QL STRIP: ABNORMAL
LEUKOCYTE ESTERASE UR QL STRIP: ABNORMAL
LYMPHOCYTES # BLD AUTO: 2.4 K/UL (ref 1–4.8)
LYMPHOCYTES NFR BLD: 40.2 % (ref 18–48)
MCH RBC QN AUTO: 25 PG (ref 27–31)
MCHC RBC AUTO-ENTMCNC: 33.2 G/DL (ref 32–36)
MCV RBC AUTO: 75 FL (ref 82–98)
MICROSCOPIC COMMENT: ABNORMAL
MONOCYTES # BLD AUTO: 0.6 K/UL (ref 0.3–1)
MONOCYTES NFR BLD: 9.5 % (ref 4–15)
NEUTROPHILS # BLD AUTO: 2.8 K/UL (ref 1.8–7.7)
NEUTROPHILS NFR BLD: 48.4 % (ref 38–73)
NITRITE UR QL STRIP: POSITIVE
NRBC BLD-RTO: 0 /100 WBC
PH UR STRIP: 5 [PH] (ref 5–8)
PLATELET # BLD AUTO: 316 K/UL (ref 150–450)
PMV BLD AUTO: 9.4 FL (ref 9.2–12.9)
POTASSIUM SERPL-SCNC: 4.1 MMOL/L (ref 3.5–5.1)
PROT SERPL-MCNC: 8.6 G/DL (ref 6–8.4)
PROT UR QL STRIP: ABNORMAL
RBC # BLD AUTO: 5.12 M/UL (ref 4–5.4)
RBC #/AREA URNS HPF: 4 /HPF (ref 0–4)
SODIUM SERPL-SCNC: 137 MMOL/L (ref 136–145)
SP GR UR STRIP: 1.01 (ref 1–1.03)
SQUAMOUS #/AREA URNS HPF: 21 /HPF
TROPONIN I SERPL DL<=0.01 NG/ML-MCNC: <0.006 NG/ML (ref 0–0.03)
URN SPEC COLLECT METH UR: ABNORMAL
UROBILINOGEN UR STRIP-ACNC: NEGATIVE EU/DL
WBC # BLD AUTO: 5.87 K/UL (ref 3.9–12.7)
WBC #/AREA URNS HPF: 8 /HPF (ref 0–5)

## 2023-06-05 PROCEDURE — 81025 URINE PREGNANCY TEST: CPT | Performed by: NURSE PRACTITIONER

## 2023-06-05 PROCEDURE — 85025 COMPLETE CBC W/AUTO DIFF WBC: CPT | Performed by: NURSE PRACTITIONER

## 2023-06-05 PROCEDURE — 63600175 PHARM REV CODE 636 W HCPCS

## 2023-06-05 PROCEDURE — 99285 EMERGENCY DEPT VISIT HI MDM: CPT | Mod: 25

## 2023-06-05 PROCEDURE — 84484 ASSAY OF TROPONIN QUANT: CPT | Performed by: NURSE PRACTITIONER

## 2023-06-05 PROCEDURE — 93010 ELECTROCARDIOGRAM REPORT: CPT | Mod: ,,, | Performed by: INTERNAL MEDICINE

## 2023-06-05 PROCEDURE — 80053 COMPREHEN METABOLIC PANEL: CPT | Performed by: NURSE PRACTITIONER

## 2023-06-05 PROCEDURE — 81000 URINALYSIS NONAUTO W/SCOPE: CPT | Performed by: EMERGENCY MEDICINE

## 2023-06-05 PROCEDURE — 93005 ELECTROCARDIOGRAM TRACING: CPT

## 2023-06-05 PROCEDURE — 25000003 PHARM REV CODE 250: Performed by: EMERGENCY MEDICINE

## 2023-06-05 PROCEDURE — 96374 THER/PROPH/DIAG INJ IV PUSH: CPT

## 2023-06-05 PROCEDURE — 93010 EKG 12-LEAD: ICD-10-PCS | Mod: ,,, | Performed by: INTERNAL MEDICINE

## 2023-06-05 RX ORDER — ONDANSETRON 4 MG/1
4 TABLET, FILM COATED ORAL EVERY 6 HOURS PRN
Qty: 12 TABLET | Refills: 0 | Status: SHIPPED | OUTPATIENT
Start: 2023-06-05

## 2023-06-05 RX ORDER — IBUPROFEN 800 MG/1
800 TABLET ORAL
Status: COMPLETED | OUTPATIENT
Start: 2023-06-05 | End: 2023-06-05

## 2023-06-05 RX ORDER — ONDANSETRON 2 MG/ML
INJECTION INTRAMUSCULAR; INTRAVENOUS
Status: COMPLETED
Start: 2023-06-05 | End: 2023-06-05

## 2023-06-05 RX ORDER — ONDANSETRON 2 MG/ML
4 INJECTION INTRAMUSCULAR; INTRAVENOUS
Status: COMPLETED | OUTPATIENT
Start: 2023-06-05 | End: 2023-06-05

## 2023-06-05 RX ADMIN — IBUPROFEN 800 MG: 800 TABLET, FILM COATED ORAL at 07:06

## 2023-06-05 RX ADMIN — SODIUM CHLORIDE 1000 ML: 9 INJECTION, SOLUTION INTRAVENOUS at 07:06

## 2023-06-05 RX ADMIN — ONDANSETRON 4 MG: 2 INJECTION INTRAMUSCULAR; INTRAVENOUS at 03:06

## 2023-06-05 NOTE — FIRST PROVIDER EVALUATION
"Medical screening examination initiated.  I have conducted a focused provider triage encounter, findings are as follows:    Brief history of present illness:  Patient with a history of syncope and collapse.  Patient reports she has been feeling nauseated for the last several days.  Patient also reports feeling weak when going from standing from sitting position    Vitals:    06/05/23 1330   BP: 112/68   BP Location: Right arm   Patient Position: Sitting   Pulse: 102   Resp: 16   Temp: 98.2 °F (36.8 °C)   TempSrc: Oral   SpO2: 100%   Weight: 68.1 kg (150 lb 0.4 oz)   Height: 5' 4" (1.626 m)       Pertinent physical exam:  No acute distress    Brief workup plan:  Labs, EKG, imaging, further eval    Preliminary workup initiated; this workup will be continued and followed by the physician or advanced practice provider that is assigned to the patient when roomed.  "

## 2023-06-06 NOTE — ED PROVIDER NOTES
SCRIBE #1 NOTE: I, Anastasiia Leal, am scribing for, and in the presence of, Pearl Martino MD. I have scribed the entire note.       History     Chief Complaint   Patient presents with    Vomiting     Vomiting, weakness     Review of patient's allergies indicates:   Allergen Reactions    Ceclor [cefaclor]     Hydrocodone Itching    Chlorphen-phenyleph-hydrocodon Palpitations         History of Present Illness     HPI    6/5/2023, 7:01 PM  History obtained from the patient      History of Present Illness: Phillip Russell is a 32 y.o. female patient with a PMHx of chronic neck pain, herniated cervical disc, who presents to the Emergency Department for evaluation of vomiting which onset 1 week PTA. Symptoms are episodic and moderate in severity. No mitigating or exacerbating factors reported. Associated sxs include nausea, generalized weakness. Patient denies any fever, chills, CP, SOB, lightheadedness, numbness, HA, and all other sxs at this time. No prior Tx reported. No further complaints or concerns at this time.       Arrival mode: Personal vehicle    PCP: YOJANA Hanson        Past Medical History:  Past Medical History:   Diagnosis Date    Bulging of cervical intervertebral disc     Herniated cervical disc     Miscarriage     Neck pain, chronic        Past Surgical History:  Past Surgical History:   Procedure Laterality Date    DILATION AND CURETTAGE OF UTERUS      HEMORRHOID SURGERY           Family History:  No family history on file.    Social History:  Social History     Tobacco Use    Smoking status: Never    Smokeless tobacco: Never   Substance and Sexual Activity    Alcohol use: No    Drug use: No    Sexual activity: Not on file        Review of Systems     Review of Systems   Constitutional:  Negative for chills and fever.   HENT:  Negative for sore throat.    Respiratory:  Negative for shortness of breath.    Cardiovascular:  Negative for chest pain.   Gastrointestinal:  Positive for  "nausea and vomiting.   Genitourinary:  Negative for dysuria.   Musculoskeletal:  Negative for back pain.   Skin:  Negative for rash.   Neurological:  Positive for weakness (generalized). Negative for light-headedness, numbness and headaches.   Hematological:  Does not bruise/bleed easily.   All other systems reviewed and are negative.     Physical Exam     Initial Vitals [06/05/23 1330]   BP Pulse Resp Temp SpO2   112/68 102 16 98.2 °F (36.8 °C) 100 %      MAP       --          Physical Exam  Nursing Notes and Vital Signs Reviewed.  Constitutional: Patient is in no acute distress. Well-developed and well-nourished.  Head: Atraumatic. Normocephalic.  Eyes: PERRL. EOM intact. Conjunctivae are not pale. No scleral icterus.  ENT: Mucous membranes are moist. Oropharynx is clear and symmetric.    Neck: Supple. Full ROM.   Cardiovascular: Regular rate. Regular rhythm. No murmurs, rubs, or gallops. Distal pulses are 2+ and symmetric.  Pulmonary/Chest: No respiratory distress. Clear to auscultation bilaterally. No wheezing or rales.  Abdominal: Soft and non-distended.  There is no tenderness.  No rebound, guarding, or rigidity.   Musculoskeletal: Moves all extremities. No obvious deformities. No edema.   Skin: Warm and dry.  Neurological:  Alert, awake, and appropriate.  Normal speech.  No acute focal neurological deficits are appreciated.  Psychiatric: Normal affect. Good eye contact. Appropriate in content.     ED Course   Procedures  ED Vital Signs:  Vitals:    06/05/23 1330 06/05/23 1533 06/05/23 1534 06/05/23 1536   BP: 112/68 114/69 110/69 118/83   Pulse: 102 85 87 109   Resp: 16 18 18 18   Temp: 98.2 °F (36.8 °C)      TempSrc: Oral      SpO2: 100% 96% (!) 94% 97%   Weight: 68.1 kg (150 lb 0.4 oz)      Height: 5' 4" (1.626 m)       06/05/23 2017   BP: 122/87   Pulse: 88   Resp: 15   Temp: 98 °F (36.7 °C)   TempSrc: Oral   SpO2: 98%   Weight:    Height:        Abnormal Lab Results:  Labs Reviewed   CBC W/ AUTO " DIFFERENTIAL - Abnormal; Notable for the following components:       Result Value    MCV 75 (*)     MCH 25.0 (*)     RDW 14.6 (*)     All other components within normal limits   COMPREHENSIVE METABOLIC PANEL - Abnormal; Notable for the following components:    CO2 22 (*)     Total Protein 8.6 (*)     All other components within normal limits   URINALYSIS, REFLEX TO URINE CULTURE - Abnormal; Notable for the following components:    Appearance, UA Hazy (*)     Protein, UA Trace (*)     Ketones, UA 2+ (*)     Occult Blood UA 2+ (*)     Nitrite, UA Positive (*)     Leukocytes, UA 1+ (*)     All other components within normal limits    Narrative:     Specimen Source->Urine   URINALYSIS MICROSCOPIC - Abnormal; Notable for the following components:    WBC, UA 8 (*)     All other components within normal limits    Narrative:     Specimen Source->Urine   TROPONIN I   PREGNANCY TEST, URINE RAPID    Narrative:     Specimen Source->Urine        All Lab Results:  Results for orders placed or performed during the hospital encounter of 06/05/23   CBC auto differential   Result Value Ref Range    WBC 5.87 3.90 - 12.70 K/uL    RBC 5.12 4.00 - 5.40 M/uL    Hemoglobin 12.8 12.0 - 16.0 g/dL    Hematocrit 38.6 37.0 - 48.5 %    MCV 75 (L) 82 - 98 fL    MCH 25.0 (L) 27.0 - 31.0 pg    MCHC 33.2 32.0 - 36.0 g/dL    RDW 14.6 (H) 11.5 - 14.5 %    Platelets 316 150 - 450 K/uL    MPV 9.4 9.2 - 12.9 fL    Immature Granulocytes 0.2 0.0 - 0.5 %    Gran # (ANC) 2.8 1.8 - 7.7 K/uL    Immature Grans (Abs) 0.01 0.00 - 0.04 K/uL    Lymph # 2.4 1.0 - 4.8 K/uL    Mono # 0.6 0.3 - 1.0 K/uL    Eos # 0.1 0.0 - 0.5 K/uL    Baso # 0.03 0.00 - 0.20 K/uL    nRBC 0 0 /100 WBC    Gran % 48.4 38.0 - 73.0 %    Lymph % 40.2 18.0 - 48.0 %    Mono % 9.5 4.0 - 15.0 %    Eosinophil % 1.2 0.0 - 8.0 %    Basophil % 0.5 0.0 - 1.9 %    Differential Method Automated    Comprehensive metabolic panel   Result Value Ref Range    Sodium 137 136 - 145 mmol/L    Potassium 4.1 3.5  - 5.1 mmol/L    Chloride 104 95 - 110 mmol/L    CO2 22 (L) 23 - 29 mmol/L    Glucose 77 70 - 110 mg/dL    BUN 10 6 - 20 mg/dL    Creatinine 1.1 0.5 - 1.4 mg/dL    Calcium 9.8 8.7 - 10.5 mg/dL    Total Protein 8.6 (H) 6.0 - 8.4 g/dL    Albumin 4.2 3.5 - 5.2 g/dL    Total Bilirubin 0.7 0.1 - 1.0 mg/dL    Alkaline Phosphatase 81 55 - 135 U/L    AST 18 10 - 40 U/L    ALT 10 10 - 44 U/L    Anion Gap 11 8 - 16 mmol/L    eGFR >60 >60 mL/min/1.73 m^2   Troponin I   Result Value Ref Range    Troponin I <0.006 0.000 - 0.026 ng/mL   Pregnancy, urine rapid (UPT)   Result Value Ref Range    Preg Test, Ur Negative    Urinalysis, Reflex to Urine Culture Urine, Clean Catch    Specimen: Urine   Result Value Ref Range    Specimen UA Urine, Clean Catch     Color, UA Yellow Yellow, Straw, Tiffany    Appearance, UA Hazy (A) Clear    pH, UA 5.0 5.0 - 8.0    Specific Gravity, UA 1.015 1.005 - 1.030    Protein, UA Trace (A) Negative    Glucose, UA Negative Negative    Ketones, UA 2+ (A) Negative    Bilirubin (UA) Negative Negative    Occult Blood UA 2+ (A) Negative    Nitrite, UA Positive (A) Negative    Urobilinogen, UA Negative <2.0 EU/dL    Leukocytes, UA 1+ (A) Negative   Urinalysis Microscopic   Result Value Ref Range    RBC, UA 4 0 - 4 /hpf    WBC, UA 8 (H) 0 - 5 /hpf    Bacteria Occasional None-Occ /hpf    Squam Epithel, UA 21 /hpf    Microscopic Comment SEE COMMENT        Imaging Results:  Imaging Results              X-Ray Chest AP Portable (Final result)  Result time 06/05/23 14:12:46      Final result by CORA Reynolds Sr., MD (06/05/23 14:12:46)                   Impression:      1. The lungs are clear.  2. There is anterior spinal fusion hardware in the cervical spine.  .      Electronically signed by: Dylan Reynolds MD  Date:    06/05/2023  Time:    14:12               Narrative:    EXAMINATION:  XR CHEST AP PORTABLE    CLINICAL HISTORY:  Syncope and collapse    COMPARISON:  10/05/2022    FINDINGS:  The size of the heart is  normal. The lungs are clear. There is no pneumothorax.  The costophrenic angles are sharp.  There is anterior spinal fusion hardware in the cervical spine.                                       The EKG was ordered, reviewed, and independently interpreted by the ED provider.  Interpretation time: 17:06  Rate: 86 BPM  Rhythm: normal sinus rhythm  Interpretation: No acute ST changes. No STEMI.           The Emergency Provider reviewed the vital signs and test results, which are outlined above.     ED Discussion       7:41 PM: Reassessed pt at this time. Discussed with pt all pertinent ED information and results. Discussed pt dx and plan of tx. Gave pt all f/u and return to the ED instructions. All questions and concerns were addressed at this time. Pt expresses understanding of information and instructions, and is comfortable with plan to discharge. Pt is stable for discharge.    I discussed with patient and/or family/caretaker that evaluation in the ED does not suggest any emergent or life threatening medical conditions requiring immediate intervention beyond what was provided in the ED, and I believe patient is safe for discharge.  Regardless, an unremarkable evaluation in the ED does not preclude the development or presence of a serious of life threatening condition. As such, patient was instructed to return immediately for any worsening or change in current symptoms.         Medical Decision Making:   Differential Diagnosis:   1. Dehydration  2. Gastritis  3. Viral Syndrome  Clinical Tests:   Lab Tests: Ordered and Reviewed  Radiological Study: Ordered and Reviewed  Medical Tests: Ordered and Reviewed  ED Management:  Presents with N/V x 1 week, no abdominal pain, no systemic symptoms, exam normal, vital signs normal, lab work reviewed and otherwise normal, given iv fluids, zofran in the ER, no vomiting, stable otherwise for discharge home.          ED Medication(s):  Medications   ondansetron injection 4 mg (4 mg  Intravenous Given 6/5/23 1526)   sodium chloride 0.9% bolus 1,000 mL 1,000 mL (0 mLs Intravenous Stopped 6/5/23 2016)   ibuprofen tablet 800 mg (800 mg Oral Given 6/5/23 1958)       Discharge Medication List as of 6/5/2023  7:41 PM        START taking these medications    Details   ondansetron (ZOFRAN) 4 MG tablet Take 1 tablet (4 mg total) by mouth every 6 (six) hours as needed for Nausea., Starting Mon 6/5/2023, Normal              Follow-up Information       YOJANA Hanson. Schedule an appointment as soon as possible for a visit in 2 days.    Specialty: Family Medicine  Why: Return to the Emergency Room, If symptoms worsen  Contact information:  [address]  [facility] [id number] [phone]                                 Scribe Attestation:   Scribe #1: I performed the above scribed service and the documentation accurately describes the services I performed. I attest to the accuracy of the note.     Attending:   Physician Attestation Statement for Scribe #1: I, Pearl Martino MD, personally performed the services described in this documentation, as scribed by Anastasiia Leal, in my presence, and it is both accurate and complete.           Clinical Impression       ICD-10-CM ICD-9-CM   1. Nausea and vomiting, unspecified vomiting type  R11.2 787.01   2. Near syncope  R55 780.2   3. Syncope  R55 780.2       Disposition:   Disposition: Discharged  Condition: Stable       Pearl Martino MD  06/06/23 1101

## 2023-06-07 LAB
CATECHOLS PLAS-MCNC: 513 PG/ML
DOPAMINE SERPL-MCNC: 52 PG/ML
EPINEPH PLAS-MCNC: 27 PG/ML
NOREPINEPH PLAS-MCNC: 434 PG/ML

## 2023-06-08 LAB
CATECHOLS PLAS-MCNC: 391 PG/ML
DOPAMINE SERPL-MCNC: <20 PG/ML
EPINEPH PLAS-MCNC: 88 PG/ML
NOREPINEPH PLAS-MCNC: 303 PG/ML

## 2023-07-31 NOTE — PROGRESS NOTES
Subjective:   Patient ID:  Phillip Russell is a 32 y.o. female who presents for follow-up of Loss of Consciousness      32 yoF referred for abnormal holter monitor and syncope.     5/23: She was admitted for syncope 2/23. CT head, MRI I brain, and MRA brain were negative for acute process. She had no event on telemetry. Holter with average HR 99 bpm- no arrhythmia detected. EF normal. She takes gabapentin and robaxin for musculoskeletal pain. She feels rapid heart rates before the events. She gets warmth and flushing.     Interval history: Metoprolol started. Tilt table performed, continuation of beta blocker recommended.     HUT 5/23:   Normal arterial terence-receptor reflex arch function.  Normal adrenal function tests.  Overall, normal blood pressure and heart rate response to head-up tilt, and,overall, normal tolerance to orthostatic stress.  The patient was minimal symptomatic with tilting,with intermittent complaints of dizziness and tingling.  Evidence of mild hypervagotonia.  No evidence of excessive venous pooling.    No C-fiber reaction was induced.      Echo 1/23:  · The left ventricle is normal in size with concentric remodeling and normal systolic function.  · The estimated ejection fraction is 60%.  · Normal left ventricular diastolic function.  · Normal right ventricular size with normal right ventricular systolic function.  · Mild tricuspid regurgitation.  · Normal central venous pressure (3 mmHg).  · The estimated PA systolic pressure is 30 mmHg.    Past Medical History:  No date: Bulging of cervical intervertebral disc  No date: Herniated cervical disc  No date: Miscarriage  No date: Neck pain, chronic    Past Surgical History:  No date: DILATION AND CURETTAGE OF UTERUS  No date: HEMORRHOID SURGERY    Social History    Socioeconomic History      Marital status: Single    Tobacco Use      Smoking status: Never      Smokeless tobacco: Never    Substance and Sexual Activity      Alcohol use:  No      Drug use: No      No family history on file.      Current Outpatient Medications:  amitriptyline (ELAVIL) 50 MG tablet, Take 50 mg by mouth every evening., Disp: , Rfl:   fluticasone propionate (FLONASE) 50 mcg/actuation nasal spray, 2 sprays (100 mcg total) by Each Nostril route once daily., Disp: 16 g, Rfl: 0  gabapentin (NEURONTIN) 600 MG tablet, Take 0.5 tablets (300 mg total) by mouth 3 (three) times daily., Disp: , Rfl:   hydrOXYzine (VISTARIL) 100 MG capsule, TAKE 1 CAPSULE BY MOUTH THREE TIMES DAILY AS NEEDED FOR ANXIETY, Disp: , Rfl:   methocarbamoL (ROBAXIN) 500 MG Tab, Take 1 tablet (500 mg total) by mouth 2 (two) times a day., Disp: , Rfl:   metoprolol succinate (TOPROL-XL) 25 MG 24 hr tablet, Take 1 tablet (25 mg total) by mouth once daily., Disp: 30 tablet, Rfl: 11  ondansetron (ZOFRAN) 4 MG tablet, Take 1 tablet (4 mg total) by mouth every 6 (six) hours as needed for Nausea., Disp: 12 tablet, Rfl: 0    No current facility-administered medications for this visit.        Review of Systems   Constitutional: Negative.   HENT: Negative.     Eyes: Negative.    Cardiovascular:  Positive for near-syncope and syncope. Negative for chest pain, dyspnea on exertion, leg swelling and palpitations.   Respiratory: Negative.  Negative for shortness of breath.    Endocrine: Negative.    Hematologic/Lymphatic: Negative.    Skin: Negative.    Musculoskeletal: Negative.    Gastrointestinal: Negative.    Genitourinary: Negative.    Neurological:  Negative for dizziness and light-headedness.   Psychiatric/Behavioral: Negative.     Allergic/Immunologic: Negative.        Objective:   Physical Exam  Vitals reviewed.   Constitutional:       General: She is not in acute distress.     Appearance: She is well-developed.   HENT:      Head: Normocephalic and atraumatic.   Eyes:      Pupils: Pupils are equal, round, and reactive to light.   Neck:      Thyroid: No thyromegaly.      Vascular: No JVD.   Cardiovascular:       Rate and Rhythm: Normal rate and regular rhythm.      Chest Wall: PMI is not displaced.      Heart sounds: Normal heart sounds, S1 normal and S2 normal. No murmur heard.     No friction rub. No gallop.   Pulmonary:      Effort: Pulmonary effort is normal. No respiratory distress.      Breath sounds: Normal breath sounds. No wheezing or rales.   Abdominal:      General: Bowel sounds are normal. There is no distension.      Palpations: Abdomen is soft.      Tenderness: There is no abdominal tenderness. There is no guarding or rebound.   Musculoskeletal:         General: No tenderness. Normal range of motion.      Cervical back: Normal range of motion.   Skin:     General: Skin is warm and dry.      Findings: No erythema or rash.   Neurological:      Mental Status: She is alert and oriented to person, place, and time.      Cranial Nerves: No cranial nerve deficit.   Psychiatric:         Behavior: Behavior normal.         Thought Content: Thought content normal.         Judgment: Judgment normal.         Assessment:      1. Recurrent syncope        Plan:     32 yoF here for VV syncope. Would use beta blockers, LE compression and fluid intake. Also avoid triggers. RTC 6m

## 2023-08-02 ENCOUNTER — OFFICE VISIT (OUTPATIENT)
Dept: CARDIOLOGY | Facility: CLINIC | Age: 33
End: 2023-08-02
Payer: MEDICAID

## 2023-08-02 VITALS
SYSTOLIC BLOOD PRESSURE: 123 MMHG | HEART RATE: 89 BPM | WEIGHT: 164.25 LBS | DIASTOLIC BLOOD PRESSURE: 83 MMHG | BODY MASS INDEX: 28.19 KG/M2

## 2023-08-02 DIAGNOSIS — R55 RECURRENT SYNCOPE: Primary | ICD-10-CM

## 2023-08-02 PROCEDURE — 3079F PR MOST RECENT DIASTOLIC BLOOD PRESSURE 80-89 MM HG: ICD-10-PCS | Mod: CPTII,,, | Performed by: INTERNAL MEDICINE

## 2023-08-02 PROCEDURE — 99214 OFFICE O/P EST MOD 30 MIN: CPT | Mod: S$PBB,,, | Performed by: INTERNAL MEDICINE

## 2023-08-02 PROCEDURE — 3008F PR BODY MASS INDEX (BMI) DOCUMENTED: ICD-10-PCS | Mod: CPTII,,, | Performed by: INTERNAL MEDICINE

## 2023-08-02 PROCEDURE — 1159F PR MEDICATION LIST DOCUMENTED IN MEDICAL RECORD: ICD-10-PCS | Mod: CPTII,,, | Performed by: INTERNAL MEDICINE

## 2023-08-02 PROCEDURE — 99999 PR PBB SHADOW E&M-EST. PATIENT-LVL III: CPT | Mod: PBBFAC,,, | Performed by: INTERNAL MEDICINE

## 2023-08-02 PROCEDURE — 3074F PR MOST RECENT SYSTOLIC BLOOD PRESSURE < 130 MM HG: ICD-10-PCS | Mod: CPTII,,, | Performed by: INTERNAL MEDICINE

## 2023-08-02 PROCEDURE — 1160F PR REVIEW ALL MEDS BY PRESCRIBER/CLIN PHARMACIST DOCUMENTED: ICD-10-PCS | Mod: CPTII,,, | Performed by: INTERNAL MEDICINE

## 2023-08-02 PROCEDURE — 99999 PR PBB SHADOW E&M-EST. PATIENT-LVL III: ICD-10-PCS | Mod: PBBFAC,,, | Performed by: INTERNAL MEDICINE

## 2023-08-02 PROCEDURE — 99213 OFFICE O/P EST LOW 20 MIN: CPT | Mod: PBBFAC | Performed by: INTERNAL MEDICINE

## 2023-08-02 PROCEDURE — 3074F SYST BP LT 130 MM HG: CPT | Mod: CPTII,,, | Performed by: INTERNAL MEDICINE

## 2023-08-02 PROCEDURE — 3008F BODY MASS INDEX DOCD: CPT | Mod: CPTII,,, | Performed by: INTERNAL MEDICINE

## 2023-08-02 PROCEDURE — 3079F DIAST BP 80-89 MM HG: CPT | Mod: CPTII,,, | Performed by: INTERNAL MEDICINE

## 2023-08-02 PROCEDURE — 99214 PR OFFICE/OUTPT VISIT, EST, LEVL IV, 30-39 MIN: ICD-10-PCS | Mod: S$PBB,,, | Performed by: INTERNAL MEDICINE

## 2023-08-02 PROCEDURE — 1160F RVW MEDS BY RX/DR IN RCRD: CPT | Mod: CPTII,,, | Performed by: INTERNAL MEDICINE

## 2023-08-02 PROCEDURE — 1159F MED LIST DOCD IN RCRD: CPT | Mod: CPTII,,, | Performed by: INTERNAL MEDICINE

## 2023-08-02 NOTE — LETTER
August 2, 2023      [facility]  [address] [facility] [address]  Phone: [phone]  Fax: [phone]       Patient: Phillip Russell   YOB: 1990  Date of Visit: 08/02/2023    To Whom It May Concern:    Mildred Russell  was at [facility] on 08/02/2023. The patient may return to work/school on 8/2/2023 {With/no:[id number]} restrictions. If you have any questions or concerns, or if I can be of further assistance, please do not hesitate to contact me.    Sincerely,    KAYLEIGH Ennis Dr

## 2023-08-02 NOTE — PATIENT INSTRUCTIONS
Remain hydrated with water or electrolyte drinks  Avoid triggers  Get to a seated or lying position when the events start  Get compression stockings  Start metoprolol

## 2023-08-29 ENCOUNTER — OFFICE VISIT (OUTPATIENT)
Dept: CARDIOLOGY | Facility: CLINIC | Age: 33
End: 2023-08-29
Payer: MEDICAID

## 2023-08-29 VITALS
BODY MASS INDEX: 28.68 KG/M2 | WEIGHT: 168 LBS | HEIGHT: 64 IN | OXYGEN SATURATION: 99 % | SYSTOLIC BLOOD PRESSURE: 110 MMHG | DIASTOLIC BLOOD PRESSURE: 70 MMHG | HEART RATE: 78 BPM

## 2023-08-29 DIAGNOSIS — R55 RECURRENT SYNCOPE: Primary | ICD-10-CM

## 2023-08-29 DIAGNOSIS — K21.9 GASTROESOPHAGEAL REFLUX DISEASE WITHOUT ESOPHAGITIS: ICD-10-CM

## 2023-08-29 DIAGNOSIS — F33.1 MODERATE EPISODE OF RECURRENT MAJOR DEPRESSIVE DISORDER: ICD-10-CM

## 2023-08-29 DIAGNOSIS — R55 SYNCOPE AND COLLAPSE: ICD-10-CM

## 2023-08-29 DIAGNOSIS — F41.9 ANXIETY: ICD-10-CM

## 2023-08-29 DIAGNOSIS — G43.809 OTHER MIGRAINE WITHOUT STATUS MIGRAINOSUS, NOT INTRACTABLE: ICD-10-CM

## 2023-08-29 PROCEDURE — 99999 PR PBB SHADOW E&M-EST. PATIENT-LVL IV: ICD-10-PCS | Mod: PBBFAC,,, | Performed by: INTERNAL MEDICINE

## 2023-08-29 PROCEDURE — 1160F PR REVIEW ALL MEDS BY PRESCRIBER/CLIN PHARMACIST DOCUMENTED: ICD-10-PCS | Mod: CPTII,,, | Performed by: INTERNAL MEDICINE

## 2023-08-29 PROCEDURE — 99214 OFFICE O/P EST MOD 30 MIN: CPT | Mod: PBBFAC,PO | Performed by: INTERNAL MEDICINE

## 2023-08-29 PROCEDURE — 99999 PR PBB SHADOW E&M-EST. PATIENT-LVL IV: CPT | Mod: PBBFAC,,, | Performed by: INTERNAL MEDICINE

## 2023-08-29 PROCEDURE — 3078F DIAST BP <80 MM HG: CPT | Mod: CPTII,,, | Performed by: INTERNAL MEDICINE

## 2023-08-29 PROCEDURE — 3074F PR MOST RECENT SYSTOLIC BLOOD PRESSURE < 130 MM HG: ICD-10-PCS | Mod: CPTII,,, | Performed by: INTERNAL MEDICINE

## 2023-08-29 PROCEDURE — 99214 PR OFFICE/OUTPT VISIT, EST, LEVL IV, 30-39 MIN: ICD-10-PCS | Mod: S$PBB,,, | Performed by: INTERNAL MEDICINE

## 2023-08-29 PROCEDURE — 3078F PR MOST RECENT DIASTOLIC BLOOD PRESSURE < 80 MM HG: ICD-10-PCS | Mod: CPTII,,, | Performed by: INTERNAL MEDICINE

## 2023-08-29 PROCEDURE — 1159F MED LIST DOCD IN RCRD: CPT | Mod: CPTII,,, | Performed by: INTERNAL MEDICINE

## 2023-08-29 PROCEDURE — 99214 OFFICE O/P EST MOD 30 MIN: CPT | Mod: S$PBB,,, | Performed by: INTERNAL MEDICINE

## 2023-08-29 PROCEDURE — 1159F PR MEDICATION LIST DOCUMENTED IN MEDICAL RECORD: ICD-10-PCS | Mod: CPTII,,, | Performed by: INTERNAL MEDICINE

## 2023-08-29 PROCEDURE — 1160F RVW MEDS BY RX/DR IN RCRD: CPT | Mod: CPTII,,, | Performed by: INTERNAL MEDICINE

## 2023-08-29 PROCEDURE — 3008F PR BODY MASS INDEX (BMI) DOCUMENTED: ICD-10-PCS | Mod: CPTII,,, | Performed by: INTERNAL MEDICINE

## 2023-08-29 PROCEDURE — 3008F BODY MASS INDEX DOCD: CPT | Mod: CPTII,,, | Performed by: INTERNAL MEDICINE

## 2023-08-29 PROCEDURE — 3074F SYST BP LT 130 MM HG: CPT | Mod: CPTII,,, | Performed by: INTERNAL MEDICINE

## 2023-08-29 RX ORDER — METOPROLOL SUCCINATE 25 MG/1
25 TABLET, EXTENDED RELEASE ORAL NIGHTLY
Qty: 90 TABLET | Refills: 3 | Status: SHIPPED | OUTPATIENT
Start: 2023-08-29 | End: 2024-08-28

## 2023-08-29 NOTE — PROGRESS NOTES
Subjective:   Patient ID:  Phillip Russell is a 32 y.o. female who presents for cardiac consult of No chief complaint on file.      HPI  The patient came in today for cardiac consult of No chief complaint on file.    Phillip Russell is a 32 y.o. female pt with recurrent syncope, migraines, depression, anxiety, cervical pain, GERD presents for CV follow up.     Jan 2021 HPI:   Ms. Russell is a 32-year-old  female with no significant medical problems, presented to the ED complaining of frequent syncopal episode for the past six months.  States that she feels lightheaded, dizzy with walking, associated with tingling in her arms and legs, unusually falls to the ground.  Sometimes loses consciousness.  Frequent a has been increasing of late.  Reports taking gabapentin, Robaxin for chronic neck pain.  Denies chest pain, palpitations.  Episodes of presyncope have been increasing significantly, almost every time she stands up.  UDS positive for PCP.  Denies alcohol use.  CT head negative for acute intracranial pathology.  Patient has no focal neurological deficits at this time.  Alert, oriented x3, unable to lift bilateral upper and lower extremities with no deficits.  Patient was seen in the ED in October of last year with unremarkable workup.  Laboratory workup today is unremarkable.  She will be placed in observation for MRI, MRA head and neck to rule out vertebrobasilar insufficiency.  Also monitored on telemetry for any possible cardiac arrhythmia as an etiology.  ED physician discussed case with on-call cardiologist, recommended echocardiogram in a.m. and IV fluids.     Placed in observation for recurrent syncope.     Hospital Course:   Phillip Russell is a 32-year-old female was placed in observation for recurrent syncope.  Workup including CT head, MRI I brain, and MRA brain were negative for acute process.  Patient also admits to memory disturbance for which she was seen by  Neurology who ordered Vitamin B levels and asked patient to wean gabapentin and muscle relaxants. She was seen by Cardiology who recommended outpatient Holter monitor-ambulatory order placed. She has not had further episodes and is stable for discharge. She was asked to follow up with PCP in one week.     2/1/23  Follow up from [facility]. ECHO 1/2023 with normal bi V function. Telemetry monitoring neg. BP and HR low this AM, she has more tremors now.     8/29/23  PT is s/p Tilt table which was neg 5/2023. Holter 2/2023 neg.     BP and HR stable today on Toprol 25mg    Patient feels  no chest pain, no sob, no leg swelling, no PND.    Patient has fairly good exercise tolerance.    Patient is compliant with medications.      TILT Table - 5/2023  Conclusions:   Normal arterial terence-receptor reflex arch function.  Normal adrenal function tests.  Overall, normal blood pressure and heart rate response to head-up tilt, and,overall, normal tolerance to orthostatic stress.  The patient was minimal symptomatic with tilting,with intermittent complaints of dizziness and tingling.  Evidence of mild hypervagotonia.  No evidence of excessive venous pooling.    No C-fiber reaction was induced.     Recommendations:  Continue beta-blockade.      HOLTER 2/2023  Rhythm    Predominant Rhythm  Sinus tachycardia with heart rates varying between 69 and 141 BPM with an average of 99BPM.     PVC    Ventricular Arrhythmias  There were PVCs totalling 0 and averaging 0 per hour.     PAC    Supraventricular Arrhythmias  There were very rare PACs totalling 3 and averaging 0.06 per hour.       Results for orders placed during the hospital encounter of 01/23/23    Echo    Interpretation Summary  · The left ventricle is normal in size with concentric remodeling and normal systolic function.  · The estimated ejection fraction is 60%.  · Normal left ventricular diastolic function.  · Normal right ventricular size with normal right ventricular systolic  function.  · Mild tricuspid regurgitation.  · Normal central venous pressure (3 mmHg).  · The estimated PA systolic pressure is 30 mmHg.      Past Medical History:   Diagnosis Date    Bulging of cervical intervertebral disc     Herniated cervical disc     Miscarriage     Neck pain, chronic        Past Surgical History:   Procedure Laterality Date    DILATION AND CURETTAGE OF UTERUS      HEMORRHOID SURGERY         Social History     Tobacco Use    Smoking status: Never    Smokeless tobacco: Never   Substance Use Topics    Alcohol use: No    Drug use: No       History reviewed. No pertinent family history.    Patient's Medications   New Prescriptions    No medications on file   Previous Medications    AMITRIPTYLINE (ELAVIL) 50 MG TABLET    Take 50 mg by mouth every evening.    FLUTICASONE PROPIONATE (FLONASE) 50 MCG/ACTUATION NASAL SPRAY    2 sprays (100 mcg total) by Each Nostril route once daily.    GABAPENTIN (NEURONTIN) 600 MG TABLET    Take 0.5 tablets (300 mg total) by mouth 3 (three) times daily.    HYDROXYZINE (VISTARIL) 100 MG CAPSULE    TAKE 1 CAPSULE BY MOUTH THREE TIMES DAILY AS NEEDED FOR ANXIETY    METHOCARBAMOL (ROBAXIN) 500 MG TAB    Take 1 tablet (500 mg total) by mouth 2 (two) times a day.    METOPROLOL SUCCINATE (TOPROL-XL) 25 MG 24 HR TABLET    Take 1 tablet (25 mg total) by mouth once daily.    ONDANSETRON (ZOFRAN) 4 MG TABLET    Take 1 tablet (4 mg total) by mouth every 6 (six) hours as needed for Nausea.   Modified Medications    No medications on file   Discontinued Medications    No medications on file       Review of Systems   Constitutional: Negative.    HENT: Negative.     Eyes: Negative.    Respiratory: Negative.     Cardiovascular: Negative.    Gastrointestinal: Negative.    Genitourinary: Negative.    Musculoskeletal: Negative.    Skin: Negative.    Neurological:  Positive for dizziness and loss of consciousness.   Endo/Heme/Allergies: Negative.    Psychiatric/Behavioral: Negative.    "  All 12 systems otherwise negative.      Wt Readings from Last 3 Encounters:   08/29/23 76.2 kg (167 lb 15.9 oz)   08/02/23 74.5 kg (164 lb 3.9 oz)   06/05/23 68.1 kg (150 lb 0.4 oz)     Temp Readings from Last 3 Encounters:   06/05/23 98 °F (36.7 °C) (Oral)   01/25/23 98.2 °F (36.8 °C) (Oral)   10/05/22 98.1 °F (36.7 °C) (Oral)     BP Readings from Last 3 Encounters:   08/29/23 110/70   08/02/23 123/83   06/05/23 122/87     Pulse Readings from Last 3 Encounters:   08/29/23 78   08/02/23 89   06/05/23 88       /70 (BP Location: Left arm, Patient Position: Sitting, BP Method: Large (Manual))   Pulse 78   Ht 5' 4" (1.626 m)   Wt 76.2 kg (167 lb 15.9 oz)   SpO2 99%   BMI 28.84 kg/m²     Objective:   Physical Exam  Vitals and nursing note reviewed.   Constitutional:       General: She is not in acute distress.     Appearance: She is well-developed. She is not diaphoretic.   HENT:      Head: Normocephalic and atraumatic.      Nose: Nose normal.   Eyes:      General: No scleral icterus.     Conjunctiva/sclera: Conjunctivae normal.   Neck:      Thyroid: No thyromegaly.      Vascular: No JVD.   Cardiovascular:      Rate and Rhythm: Normal rate and regular rhythm.      Heart sounds: S1 normal and S2 normal. No murmur heard.     No friction rub. No gallop. No S3 or S4 sounds.   Pulmonary:      Effort: Pulmonary effort is normal. No respiratory distress.      Breath sounds: Normal breath sounds. No stridor. No wheezing or rales.   Chest:      Chest wall: No tenderness.   Abdominal:      General: Bowel sounds are normal. There is no distension.      Palpations: Abdomen is soft. There is no mass.      Tenderness: There is no abdominal tenderness. There is no rebound.   Genitourinary:     Comments: Deferred  Musculoskeletal:         General: No tenderness or deformity. Normal range of motion.      Cervical back: Normal range of motion and neck supple.   Lymphadenopathy:      Cervical: No cervical adenopathy.   Skin:   "   General: Skin is warm and dry.      Coloration: Skin is not pale.      Findings: No erythema or rash.   Neurological:      Mental Status: She is alert and oriented to person, place, and time.      Motor: No abnormal muscle tone.      Coordination: Coordination normal.   Psychiatric:         Behavior: Behavior normal.         Thought Content: Thought content normal.         Judgment: Judgment normal.         Lab Results   Component Value Date     06/05/2023    K 4.1 06/05/2023     06/05/2023    CO2 22 (L) 06/05/2023    BUN 10 06/05/2023    CREATININE 1.1 06/05/2023    GLU 77 06/05/2023    HGBA1C 5.3 10/11/2022    MG 1.8 01/24/2023    AST 18 06/05/2023    ALT 10 06/05/2023    ALBUMIN 4.2 06/05/2023    PROT 8.6 (H) 06/05/2023    BILITOT 0.7 06/05/2023    WBC 5.87 06/05/2023    HGB 12.8 06/05/2023    HCT 38.6 06/05/2023    MCV 75 (L) 06/05/2023     06/05/2023    TSH 1.350 01/23/2023     Assessment:      1. Recurrent syncope    2. Syncope and collapse    3. Other migraine without status migrainosus, not intractable    4. Gastroesophageal reflux disease without esophagitis    5. Moderate episode of recurrent major depressive disorder          Plan:       Recurrent syncope - s/p Jan 2023 hosp eval  - Denies palpitations and chest pain  - Per patient she has been told she has irregular heart rhythm  - Echo normal  - PT is s/p Tilt table which was neg 5/2023. Holter 2/2023 neg.   - f/u EP, cont BB QHS  - f/u neuro, decreased pain tx     2. Migraines  - cont tx per PCP/neuro    3. Anxiety/depression  - cont tx per PCP/psych  - refer to psych  - rec meditation     4. Cervical pain  - cont pain tx    5. GERD  - rec PPI.    Thank you for allowing me to participate in this patient's care. Please do not hesitate to contact me with any questions or concerns. Consult note has been forwarded to the referral physician.

## 2024-03-13 DIAGNOSIS — R55 RECURRENT SYNCOPE: Primary | ICD-10-CM

## 2024-03-13 DIAGNOSIS — Z00.00 ROUTINE ADULT HEALTH MAINTENANCE: ICD-10-CM

## 2024-04-08 ENCOUNTER — HOSPITAL ENCOUNTER (OUTPATIENT)
Dept: CARDIOLOGY | Facility: HOSPITAL | Age: 34
Discharge: HOME | End: 2024-04-08
Attending: INTERNAL MEDICINE
Payer: MEDICAID

## 2024-04-08 ENCOUNTER — OFFICE VISIT (OUTPATIENT)
Dept: CARDIOLOGY | Facility: CLINIC | Age: 34
End: 2024-04-08
Payer: MEDICAID

## 2024-04-08 VITALS
HEART RATE: 82 BPM | SYSTOLIC BLOOD PRESSURE: 112 MMHG | DIASTOLIC BLOOD PRESSURE: 72 MMHG | OXYGEN SATURATION: 99 % | HEIGHT: 64 IN | BODY MASS INDEX: 26.49 KG/M2 | WEIGHT: 155.19 LBS

## 2024-04-08 DIAGNOSIS — Z00.00 ROUTINE ADULT HEALTH MAINTENANCE: ICD-10-CM

## 2024-04-08 DIAGNOSIS — F41.9 ANXIETY: ICD-10-CM

## 2024-04-08 DIAGNOSIS — M54.2 CERVICAL PAIN: ICD-10-CM

## 2024-04-08 DIAGNOSIS — R55 RECURRENT SYNCOPE: Primary | ICD-10-CM

## 2024-04-08 DIAGNOSIS — G43.809 OTHER MIGRAINE WITHOUT STATUS MIGRAINOSUS, NOT INTRACTABLE: ICD-10-CM

## 2024-04-08 DIAGNOSIS — F33.1 MODERATE EPISODE OF RECURRENT MAJOR DEPRESSIVE DISORDER: ICD-10-CM

## 2024-04-08 DIAGNOSIS — E55.9 VITAMIN D DEFICIENCY: ICD-10-CM

## 2024-04-08 DIAGNOSIS — R55 RECURRENT SYNCOPE: ICD-10-CM

## 2024-04-08 DIAGNOSIS — R55 SYNCOPE AND COLLAPSE: ICD-10-CM

## 2024-04-08 DIAGNOSIS — K21.9 GASTROESOPHAGEAL REFLUX DISEASE WITHOUT ESOPHAGITIS: ICD-10-CM

## 2024-04-08 DIAGNOSIS — R00.2 PALPITATIONS: ICD-10-CM

## 2024-04-08 DIAGNOSIS — E61.1 IRON DEFICIENCY: ICD-10-CM

## 2024-04-08 DIAGNOSIS — R53.83 FATIGUE, UNSPECIFIED TYPE: ICD-10-CM

## 2024-04-08 PROCEDURE — 99214 OFFICE O/P EST MOD 30 MIN: CPT | Mod: S$PBB,,, | Performed by: INTERNAL MEDICINE

## 2024-04-08 PROCEDURE — 1160F RVW MEDS BY RX/DR IN RCRD: CPT | Mod: CPTII,,, | Performed by: INTERNAL MEDICINE

## 2024-04-08 PROCEDURE — 3074F SYST BP LT 130 MM HG: CPT | Mod: CPTII,,, | Performed by: INTERNAL MEDICINE

## 2024-04-08 PROCEDURE — 3008F BODY MASS INDEX DOCD: CPT | Mod: CPTII,,, | Performed by: INTERNAL MEDICINE

## 2024-04-08 PROCEDURE — 99213 OFFICE O/P EST LOW 20 MIN: CPT | Mod: PBBFAC,PO | Performed by: INTERNAL MEDICINE

## 2024-04-08 PROCEDURE — 1159F MED LIST DOCD IN RCRD: CPT | Mod: CPTII,,, | Performed by: INTERNAL MEDICINE

## 2024-04-08 PROCEDURE — 99999 PR PBB SHADOW E&M-EST. PATIENT-LVL III: CPT | Mod: PBBFAC,,, | Performed by: INTERNAL MEDICINE

## 2024-04-08 PROCEDURE — 3078F DIAST BP <80 MM HG: CPT | Mod: CPTII,,, | Performed by: INTERNAL MEDICINE

## 2024-04-08 PROCEDURE — G2211 COMPLEX E/M VISIT ADD ON: HCPCS | Mod: S$PBB,,, | Performed by: INTERNAL MEDICINE

## 2024-04-08 RX ORDER — MIDODRINE HYDROCHLORIDE 5 MG/1
5 TABLET ORAL 3 TIMES DAILY PRN
Qty: 90 TABLET | Refills: 3 | Status: SHIPPED | OUTPATIENT
Start: 2024-04-08 | End: 2025-04-08

## 2024-04-08 RX ORDER — ERGOCALCIFEROL 1.25 MG/1
50000 CAPSULE ORAL
Qty: 4 CAPSULE | Refills: 2 | Status: SHIPPED | OUTPATIENT
Start: 2024-04-08

## 2024-04-08 RX ORDER — METOPROLOL SUCCINATE 25 MG/1
25 TABLET, EXTENDED RELEASE ORAL NIGHTLY
Qty: 90 TABLET | Refills: 3 | Status: SHIPPED | OUTPATIENT
Start: 2024-04-08 | End: 2025-04-08

## 2024-04-08 NOTE — PROGRESS NOTES
Subjective:   Patient ID:  Phillip Russell is a 33 y.o. female who presents for cardiac consult of No chief complaint on file.      HPI  The patient came in today for cardiac consult of No chief complaint on file.    Phillip Russell is a 33 y.o. female pt with recurrent syncope, migraines, depression, anxiety, cervical pain, GERD presents for CV follow up.     Jan 2021 HPI:   Ms. Russell is a 32-year-old  female with no significant medical problems, presented to the ED complaining of frequent syncopal episode for the past six months.  States that she feels lightheaded, dizzy with walking, associated with tingling in her arms and legs, unusually falls to the ground.  Sometimes loses consciousness.  Frequent a has been increasing of late.  Reports taking gabapentin, Robaxin for chronic neck pain.  Denies chest pain, palpitations.  Episodes of presyncope have been increasing significantly, almost every time she stands up.  UDS positive for PCP.  Denies alcohol use.  CT head negative for acute intracranial pathology.  Patient has no focal neurological deficits at this time.  Alert, oriented x3, unable to lift bilateral upper and lower extremities with no deficits.  Patient was seen in the ED in October of last year with unremarkable workup.  Laboratory workup today is unremarkable.  She will be placed in observation for MRI, MRA head and neck to rule out vertebrobasilar insufficiency.  Also monitored on telemetry for any possible cardiac arrhythmia as an etiology.  ED physician discussed case with on-call cardiologist, recommended echocardiogram in a.m. and IV fluids.     Placed in observation for recurrent syncope.     Hospital Course:   Phillip Russell is a 32-year-old female was placed in observation for recurrent syncope.  Workup including CT head, MRI I brain, and MRA brain were negative for acute process.  Patient also admits to memory disturbance for which she was seen by  Neurology who ordered Vitamin B levels and asked patient to wean gabapentin and muscle relaxants. She was seen by Cardiology who recommended outpatient Holter monitor-ambulatory order placed. She has not had further episodes and is stable for discharge. She was asked to follow up with PCP in one week.     2/1/23  Follow up from [facility]. ECHO 1/2023 with normal bi V function. Telemetry monitoring neg. BP and HR low this AM, she has more tremors now.     8/29/23  PT is s/p Tilt table which was neg 5/2023. Holter 2/2023 neg.   BP and HR stable today on Toprol 25mg  She had some palpitations as well but no other major issues.       4/8/24  BP and HR well controlled. BMI 26 - 155 lbs   She felt presyncopal over the weekend.       TILT Table - 5/2023  Conclusions:   Normal arterial terence-receptor reflex arch function.  Normal adrenal function tests.  Overall, normal blood pressure and heart rate response to head-up tilt, and,overall, normal tolerance to orthostatic stress.  The patient was minimal symptomatic with tilting,with intermittent complaints of dizziness and tingling.  Evidence of mild hypervagotonia.  No evidence of excessive venous pooling.    No C-fiber reaction was induced.     Recommendations:  Continue beta-blockade.      HOLTER 2/2023  Rhythm    Predominant Rhythm  Sinus tachycardia with heart rates varying between 69 and 141 BPM with an average of 99BPM.     PVC    Ventricular Arrhythmias  There were PVCs totalling 0 and averaging 0 per hour.     PAC    Supraventricular Arrhythmias  There were very rare PACs totalling 3 and averaging 0.06 per hour.       Results for orders placed during the hospital encounter of 01/23/23    Echo    Interpretation Summary  · The left ventricle is normal in size with concentric remodeling and normal systolic function.  · The estimated ejection fraction is 60%.  · Normal left ventricular diastolic function.  · Normal right ventricular size with normal right ventricular  systolic function.  · Mild tricuspid regurgitation.  · Normal central venous pressure (3 mmHg).  · The estimated PA systolic pressure is 30 mmHg.      Past Medical History:   Diagnosis Date    Bulging of cervical intervertebral disc     Herniated cervical disc     Miscarriage     Neck pain, chronic        Past Surgical History:   Procedure Laterality Date    DILATION AND CURETTAGE OF UTERUS      HEMORRHOID SURGERY         Social History     Tobacco Use    Smoking status: Never    Smokeless tobacco: Never   Substance Use Topics    Alcohol use: No    Drug use: No       History reviewed. No pertinent family history.    Patient's Medications   New Prescriptions    No medications on file   Previous Medications    AMITRIPTYLINE (ELAVIL) 50 MG TABLET    Take 50 mg by mouth every evening.    FLUTICASONE PROPIONATE (FLONASE) 50 MCG/ACTUATION NASAL SPRAY    2 sprays (100 mcg total) by Each Nostril route once daily.    GABAPENTIN (NEURONTIN) 600 MG TABLET    Take 0.5 tablets (300 mg total) by mouth 3 (three) times daily.    HYDROXYZINE (VISTARIL) 100 MG CAPSULE    TAKE 1 CAPSULE BY MOUTH THREE TIMES DAILY AS NEEDED FOR ANXIETY    METHOCARBAMOL (ROBAXIN) 500 MG TAB    Take 1 tablet (500 mg total) by mouth 2 (two) times a day.    METOPROLOL SUCCINATE (TOPROL-XL) 25 MG 24 HR TABLET    Take 1 tablet (25 mg total) by mouth every evening.    ONDANSETRON (ZOFRAN) 4 MG TABLET    Take 1 tablet (4 mg total) by mouth every 6 (six) hours as needed for Nausea.   Modified Medications    No medications on file   Discontinued Medications    No medications on file       Review of Systems   Constitutional: Negative.    HENT: Negative.     Eyes: Negative.    Respiratory: Negative.     Cardiovascular: Negative.    Gastrointestinal: Negative.    Genitourinary: Negative.    Musculoskeletal: Negative.    Skin: Negative.    Neurological:  Positive for dizziness and loss of consciousness.   Endo/Heme/Allergies: Negative.    Psychiatric/Behavioral:  "Negative.     All 12 systems otherwise negative.      Wt Readings from Last 3 Encounters:   04/08/24 70.4 kg (155 lb 3.3 oz)   08/29/23 76.2 kg (167 lb 15.9 oz)   08/02/23 74.5 kg (164 lb 3.9 oz)     Temp Readings from Last 3 Encounters:   06/05/23 98 °F (36.7 °C) (Oral)   01/25/23 98.2 °F (36.8 °C) (Oral)   10/05/22 98.1 °F (36.7 °C) (Oral)     BP Readings from Last 3 Encounters:   04/08/24 112/72   08/29/23 110/70   08/02/23 123/83     Pulse Readings from Last 3 Encounters:   04/08/24 82   08/29/23 78   08/02/23 89       /72   Pulse 82   Ht 5' 4" (1.626 m)   Wt 70.4 kg (155 lb 3.3 oz)   SpO2 99%   BMI 26.64 kg/m²     Objective:   Physical Exam  Vitals and nursing note reviewed.   Constitutional:       General: She is not in acute distress.     Appearance: She is well-developed. She is not diaphoretic.   HENT:      Head: Normocephalic and atraumatic.      Nose: Nose normal.   Eyes:      General: No scleral icterus.     Conjunctiva/sclera: Conjunctivae normal.   Neck:      Thyroid: No thyromegaly.      Vascular: No JVD.   Cardiovascular:      Rate and Rhythm: Normal rate and regular rhythm.      Heart sounds: S1 normal and S2 normal. No murmur heard.     No friction rub. No gallop. No S3 or S4 sounds.   Pulmonary:      Effort: Pulmonary effort is normal. No respiratory distress.      Breath sounds: Normal breath sounds. No stridor. No wheezing or rales.   Chest:      Chest wall: No tenderness.   Abdominal:      General: Bowel sounds are normal. There is no distension.      Palpations: Abdomen is soft. There is no mass.      Tenderness: There is no abdominal tenderness. There is no rebound.   Genitourinary:     Comments: Deferred  Musculoskeletal:         General: No tenderness or deformity. Normal range of motion.      Cervical back: Normal range of motion and neck supple.   Lymphadenopathy:      Cervical: No cervical adenopathy.   Skin:     General: Skin is warm and dry.      Coloration: Skin is not " pale.      Findings: No erythema or rash.   Neurological:      Mental Status: She is alert and oriented to person, place, and time.      Motor: No abnormal muscle tone.      Coordination: Coordination normal.   Psychiatric:         Behavior: Behavior normal.         Thought Content: Thought content normal.         Judgment: Judgment normal.         Lab Results   Component Value Date     06/05/2023    K 4.1 06/05/2023     06/05/2023    CO2 22 (L) 06/05/2023    BUN 10 06/05/2023    CREATININE 1.1 06/05/2023    GLU 77 06/05/2023    HGBA1C 5.3 10/11/2022    MG 1.8 01/24/2023    AST 18 06/05/2023    ALT 10 06/05/2023    ALBUMIN 4.2 06/05/2023    PROT 8.6 (H) 06/05/2023    BILITOT 0.7 06/05/2023    WBC 5.87 06/05/2023    HGB 12.8 06/05/2023    HCT 38.6 06/05/2023    MCV 75 (L) 06/05/2023     06/05/2023    TSH 1.350 01/23/2023     Assessment:      1. Recurrent syncope    2. Syncope and collapse    3. Other migraine without status migrainosus, not intractable    4. Gastroesophageal reflux disease without esophagitis    5. Moderate episode of recurrent major depressive disorder    6. Anxiety    7. Cervical pain            Plan:       Recurrent syncope - s/p Jan 2023 hosp eval  - Denies palpitations and chest pain  - Per patient she has been told she has irregular heart rhythm  - prior Echo normal  - PT is s/p Tilt table which was neg 5/2023. Holter 2/2023 neg.   - f/u EP, cont BB QHS  - f/u neuro, decreased pain tx   - 7 day vital monitor ordered  - order iron levels, Vitamin b12  - start PRN midodrine 5mg for BP below 110/70    2. Migraines  - cont tx per PCP/neuro    3. Anxiety/depression  - cont tx per PCP/psych  - refer to psych  - rec meditation     4. Cervical pain  - cont pain tx    5. GERD  - rec PPI    Visit today included increased complexity associated with the care of the episodic problem syncope addressed and managing the longitudinal care of the patient due to the serious and/or complex  managed problem(s) .      Thank you for allowing me to participate in this patient's care. Please do not hesitate to contact me with any questions or concerns. Consult note has been forwarded to the referral physician.

## 2024-04-09 ENCOUNTER — PATIENT MESSAGE (OUTPATIENT)
Dept: CARDIOLOGY | Facility: CLINIC | Age: 34
End: 2024-04-09
Payer: MEDICAID

## 2024-04-09 DIAGNOSIS — E61.1 IRON DEFICIENCY: Primary | ICD-10-CM

## 2024-04-09 RX ORDER — FERROUS SULFATE 325(65) MG
325 TABLET ORAL
Qty: 90 TABLET | Refills: 1 | Status: SHIPPED | OUTPATIENT
Start: 2024-04-09

## 2024-04-10 ENCOUNTER — TELEPHONE (OUTPATIENT)
Dept: HEMATOLOGY/ONCOLOGY | Facility: CLINIC | Age: 34
End: 2024-04-10
Payer: MEDICAID

## 2024-04-10 ENCOUNTER — PATIENT MESSAGE (OUTPATIENT)
Dept: HEMATOLOGY/ONCOLOGY | Facility: CLINIC | Age: 34
End: 2024-04-10
Payer: MEDICAID

## 2024-04-11 ENCOUNTER — PATIENT MESSAGE (OUTPATIENT)
Dept: INFUSION THERAPY | Facility: HOSPITAL | Age: 34
End: 2024-04-11
Payer: MEDICAID

## 2024-04-19 ENCOUNTER — PATIENT MESSAGE (OUTPATIENT)
Dept: RESEARCH | Facility: HOSPITAL | Age: 34
End: 2024-04-19
Payer: MEDICAID

## 2024-04-26 ENCOUNTER — HOSPITAL ENCOUNTER (OUTPATIENT)
Dept: CARDIOLOGY | Facility: HOSPITAL | Age: 34
Discharge: HOME | End: 2024-04-26
Attending: INTERNAL MEDICINE
Payer: MEDICAID

## 2024-04-26 DIAGNOSIS — G43.809 OTHER MIGRAINE WITHOUT STATUS MIGRAINOSUS, NOT INTRACTABLE: ICD-10-CM

## 2024-04-26 DIAGNOSIS — K21.9 GASTROESOPHAGEAL REFLUX DISEASE WITHOUT ESOPHAGITIS: ICD-10-CM

## 2024-04-26 DIAGNOSIS — M54.2 CERVICAL PAIN: ICD-10-CM

## 2024-04-26 DIAGNOSIS — F33.1 MODERATE EPISODE OF RECURRENT MAJOR DEPRESSIVE DISORDER: ICD-10-CM

## 2024-04-26 DIAGNOSIS — R55 SYNCOPE AND COLLAPSE: ICD-10-CM

## 2024-04-26 DIAGNOSIS — R00.2 PALPITATIONS: ICD-10-CM

## 2024-04-26 DIAGNOSIS — F41.9 ANXIETY: ICD-10-CM

## 2024-04-26 DIAGNOSIS — R55 RECURRENT SYNCOPE: ICD-10-CM

## 2024-04-26 PROCEDURE — 93244 EXT ECG>48HR<7D REV&INTERPJ: CPT | Mod: ,,, | Performed by: INTERNAL MEDICINE

## 2024-05-07 LAB
OHS CV HOLTER SINUS AVERAGE HR: 74
OHS CV HOLTER SINUS MAX HR: 158
OHS CV HOLTER SINUS MIN HR: 48

## 2024-05-08 ENCOUNTER — PATIENT MESSAGE (OUTPATIENT)
Dept: RESEARCH | Facility: HOSPITAL | Age: 34
End: 2024-05-08
Payer: MEDICAID

## 2024-05-15 DIAGNOSIS — R55 RECURRENT SYNCOPE: Primary | ICD-10-CM

## 2024-05-22 ENCOUNTER — ON-DEMAND VIRTUAL (OUTPATIENT)
Dept: URGENT CARE | Facility: CLINIC | Age: 34
End: 2024-05-22
Payer: MEDICAID

## 2024-05-22 DIAGNOSIS — M54.2 NECK PAIN: Primary | ICD-10-CM

## 2024-05-22 PROCEDURE — 99213 OFFICE O/P EST LOW 20 MIN: CPT | Mod: 95,,, | Performed by: NURSE PRACTITIONER

## 2024-05-22 RX ORDER — MELOXICAM 15 MG/1
15 TABLET ORAL DAILY
Qty: 30 TABLET | Refills: 0 | Status: SHIPPED | OUTPATIENT
Start: 2024-05-22 | End: 2024-06-21

## 2024-05-22 RX ORDER — METHYLPREDNISOLONE 4 MG/1
TABLET ORAL
Qty: 21 EACH | Refills: 0 | Status: SHIPPED | OUTPATIENT
Start: 2024-05-22

## 2024-05-22 NOTE — PATIENT INSTRUCTIONS
Recommendations:     .Rest, Ice/Heat may be beneficial.     .NSAIDs(Meloxicam, Ibuprofen, or Aleve) and Tylenol over the counter as directed.     . Monitor for worsening symptoms: increased pain, swelling, redness/bruising, numbness, tingling or decreased mobility.

## 2024-05-22 NOTE — PROGRESS NOTES
Subjective:      Patient ID: Phillip Russell is a 33 y.o. female.    Vitals:  vitals were not taken for this visit.     Chief Complaint: Otalgia and Neck Pain      Visit Type: TELE AUDIOVISUAL    Present with the patient at the time of consultation: TELEMED PRESENT WITH PATIENT: None, at home    Past Medical History:   Diagnosis Date    Bulging of cervical intervertebral disc     Herniated cervical disc     Miscarriage     Neck pain, chronic      Past Surgical History:   Procedure Laterality Date    DILATION AND CURETTAGE OF UTERUS      HEMORRHOID SURGERY       Review of patient's allergies indicates:   Allergen Reactions    Ceclor [cefaclor]     Hydrocodone Itching    Chlorphen-phenyleph-hydrocodon Palpitations     Current Outpatient Medications on File Prior to Visit   Medication Sig Dispense Refill    amitriptyline (ELAVIL) 50 MG tablet Take 50 mg by mouth every evening.      ergocalciferol (VITAMIN D2) 50,000 unit Cap Take 1 capsule (50,000 Units total) by mouth every 7 days. 4 capsule 2    ferrous sulfate (FEOSOL) 325 mg (65 mg iron) Tab tablet Take 1 tablet (325 mg total) by mouth daily with breakfast. 90 tablet 1    fluticasone propionate (FLONASE) 50 mcg/actuation nasal spray 2 sprays (100 mcg total) by Each Nostril route once daily. (Patient not taking: Reported on 4/8/2024) 16 g 0    gabapentin (NEURONTIN) 600 MG tablet Take 0.5 tablets (300 mg total) by mouth 3 (three) times daily.      hydrOXYzine (VISTARIL) 100 MG capsule TAKE 1 CAPSULE BY MOUTH THREE TIMES DAILY AS NEEDED FOR ANXIETY      methocarbamoL (ROBAXIN) 500 MG Tab Take 1 tablet (500 mg total) by mouth 2 (two) times a day.      metoprolol succinate (TOPROL-XL) 25 MG 24 hr tablet Take 1 tablet (25 mg total) by mouth every evening. 90 tablet 3    midodrine (PROAMATINE) 5 MG Tab Take 1 tablet (5 mg total) by mouth 3 (three) times daily as needed (for BP below 110/70). 90 tablet 3    ondansetron (ZOFRAN) 4 MG tablet Take 1 tablet (4 mg  total) by mouth every 6 (six) hours as needed for Nausea. 12 tablet 0     No current facility-administered medications on file prior to visit.     No family history on file.    Medications Ordered                [id number] [place] - [address] AT [address]   [address] [facility] [place] [address]    Telephone: [phone]   Fax: [phone]   Hours: Not open 24 hours                         E-Prescribed (2 of 2)              meloxicam (MOBIC) 15 MG tablet    Sig: Take 1 tablet (15 mg total) by mouth once daily. Take with food.       Start: 5/22/24     Quantity: 30 tablet Refills: 0                         methylPREDNISolone (MEDROL DOSEPACK) 4 mg tablet    Sig: use as directed       Start: 5/22/24     Quantity: 21 each Refills: 0                           [facility] Peq Odvv Intake    5/22/2024  8:32 AM CDT - Filed by Patient   What is your current physical address in the event of a medical emergency? [address] [facility] [id number]   Are you able to take your vital signs? Yes   Systolic Blood Pressure: 94   Diastolic Blood Pressure: 70   Weight: 142   Height: 5.4   Pulse: 73   Temperature: 98   Respiration rate:    Pulse Oxygen: 96   Please attach any relevant images or files          Last night started with left ear pain and pain with swallowing, although denies sore throat. Also reports neck pain. Intermittent numbness and tingling to the arms. No weakness. Hx of spinal fusion. Not taking anything currently.    Otalgia   Associated symptoms include neck pain. Pertinent negatives include no coughing, ear discharge, hearing loss or sore throat.   Neck Pain   Associated symptoms include numbness (intermittent). Pertinent negatives include no fever or trouble swallowing.       Constitution: Negative for chills and fever.   HENT:  Positive for ear pain. Negative for ear discharge, tinnitus, hearing loss, congestion, sore throat, trouble swallowing and voice change.     Neck: Positive for neck pain. Negative for neck stiffness and painful lymph nodes.   Respiratory:  Negative for cough, shortness of breath and wheezing.    Neurological:  Positive for numbness (intermittent) and tingling (intermittent).   Hematologic/Lymphatic: Negative for swollen lymph nodes.        Objective:   The physical exam was conducted virtually.  Physical Exam   Constitutional: She is oriented to person, place, and time. She does not appear ill. No distress.   HENT:   Head: Normocephalic and atraumatic.       Nose: Nose normal.   Pain around left ear from forehead down to neck      Comments: Pain around left ear from forehead down to neck  Eyes: Extraocular movement intact   Neck: There are no signs of injury. No neck rigidity present. No decreased range of motion present. pain with movement present. muscular tenderness (on self exam) present.   Pulmonary/Chest: Effort normal.   Abdominal: Normal appearance.   Musculoskeletal: Normal range of motion.         General: Normal range of motion.   Neurological: no focal deficit. She is alert and oriented to person, place, and time.   Psychiatric: Her behavior is normal. Mood normal.   Vitals reviewed.      Assessment:     1. Neck pain        Plan:     Patient encouraged to monitor symptoms closely and instructed to follow-up for new or worsening symptoms. Further, in-person, evaluation may be necessary for continued treatment. Please follow up with your primary care doctor or specialist as needed. Verbally discussed plan. Patient confirms understanding and is in agreement with treatment and plan.     You must understand that you've received a Virtual Care evaluation only and that you may be released before all your medical problems are known or treated. You, the patient, will arrange for follow up care as instructed.    Neck pain  -     meloxicam (MOBIC) 15 MG tablet; Take 1 tablet (15 mg total) by mouth once daily. Take with food.  Dispense: 30 tablet;  Refill: 0  -     methylPREDNISolone (MEDROL DOSEPACK) 4 mg tablet; use as directed  Dispense: 21 each; Refill: 0      Patient Instructions   Recommendations:     .Rest, Ice/Heat may be beneficial.     .NSAIDs(Meloxicam, Ibuprofen, or Aleve) and Tylenol over the counter as directed.     . Monitor for worsening symptoms: increased pain, swelling, redness/bruising, numbness, tingling or decreased mobility.